# Patient Record
Sex: FEMALE | Race: WHITE | NOT HISPANIC OR LATINO | Employment: FULL TIME | URBAN - METROPOLITAN AREA
[De-identification: names, ages, dates, MRNs, and addresses within clinical notes are randomized per-mention and may not be internally consistent; named-entity substitution may affect disease eponyms.]

---

## 2020-02-18 ENCOUNTER — TRANSCRIBE ORDERS (OUTPATIENT)
Dept: ADMINISTRATIVE | Facility: HOSPITAL | Age: 50
End: 2020-02-18

## 2020-02-18 DIAGNOSIS — Z12.31 VISIT FOR SCREENING MAMMOGRAM: Primary | ICD-10-CM

## 2020-06-18 ENCOUNTER — HOSPITAL ENCOUNTER (OUTPATIENT)
Dept: MAMMOGRAPHY | Facility: HOSPITAL | Age: 50
Discharge: HOME | End: 2020-06-18
Admitting: PHYSICIAN ASSISTANT

## 2020-06-18 DIAGNOSIS — Z12.31 VISIT FOR SCREENING MAMMOGRAM: ICD-10-CM

## 2020-06-18 PROCEDURE — 77063 BREAST TOMOSYNTHESIS BI: CPT | Performed by: RADIOLOGY

## 2020-06-18 PROCEDURE — 77067 SCR MAMMO BI INCL CAD: CPT | Performed by: RADIOLOGY

## 2020-06-18 PROCEDURE — 77063 BREAST TOMOSYNTHESIS BI: CPT

## 2020-06-18 PROCEDURE — 77067 SCR MAMMO BI INCL CAD: CPT

## 2020-12-26 ENCOUNTER — HOSPITAL ENCOUNTER (EMERGENCY)
Facility: HOSPITAL | Age: 50
Discharge: HOME | End: 2020-12-27
Attending: EMERGENCY MEDICINE | Admitting: EMERGENCY MEDICINE

## 2020-12-26 ENCOUNTER — APPOINTMENT (OUTPATIENT)
Dept: CT IMAGING | Facility: HOSPITAL | Age: 50
End: 2020-12-26

## 2020-12-26 DIAGNOSIS — D25.9 UTERINE LEIOMYOMA, UNSPECIFIED LOCATION: ICD-10-CM

## 2020-12-26 DIAGNOSIS — R10.9 RIGHT FLANK PAIN: Primary | ICD-10-CM

## 2020-12-26 DIAGNOSIS — N39.0 ACUTE UTI: ICD-10-CM

## 2020-12-26 DIAGNOSIS — K59.00 CONSTIPATION, UNSPECIFIED CONSTIPATION TYPE: ICD-10-CM

## 2020-12-26 DIAGNOSIS — R10.30 LOWER ABDOMINAL PAIN: ICD-10-CM

## 2020-12-26 LAB
ALBUMIN SERPL-MCNC: 4.6 G/DL (ref 3.5–5.2)
ALBUMIN/GLOB SERPL: 1.6 G/DL
ALP SERPL-CCNC: 108 U/L (ref 39–117)
ALT SERPL W P-5'-P-CCNC: 13 U/L (ref 1–33)
ANION GAP SERPL CALCULATED.3IONS-SCNC: 12 MMOL/L (ref 5–15)
AST SERPL-CCNC: 13 U/L (ref 1–32)
B-HCG UR QL: NEGATIVE
BACTERIA UR QL AUTO: ABNORMAL /HPF
BASOPHILS # BLD AUTO: 0.02 10*3/MM3 (ref 0–0.2)
BASOPHILS NFR BLD AUTO: 0.2 % (ref 0–1.5)
BILIRUB SERPL-MCNC: 0.3 MG/DL (ref 0–1.2)
BILIRUB UR QL STRIP: NEGATIVE
BUN SERPL-MCNC: 15 MG/DL (ref 6–20)
BUN/CREAT SERPL: 17.2 (ref 7–25)
CALCIUM SPEC-SCNC: 9.3 MG/DL (ref 8.6–10.5)
CHLORIDE SERPL-SCNC: 101 MMOL/L (ref 98–107)
CLARITY UR: CLEAR
CO2 SERPL-SCNC: 24 MMOL/L (ref 22–29)
COLOR UR: YELLOW
CREAT SERPL-MCNC: 0.87 MG/DL (ref 0.57–1)
DEPRECATED RDW RBC AUTO: 40.9 FL (ref 37–54)
EOSINOPHIL # BLD AUTO: 0.14 10*3/MM3 (ref 0–0.4)
EOSINOPHIL NFR BLD AUTO: 1.7 % (ref 0.3–6.2)
ERYTHROCYTE [DISTWIDTH] IN BLOOD BY AUTOMATED COUNT: 11.9 % (ref 12.3–15.4)
GFR SERPL CREATININE-BSD FRML MDRD: 68 ML/MIN/1.73
GLOBULIN UR ELPH-MCNC: 2.9 GM/DL
GLUCOSE SERPL-MCNC: 116 MG/DL (ref 65–99)
GLUCOSE UR STRIP-MCNC: NEGATIVE MG/DL
HCT VFR BLD AUTO: 42.6 % (ref 34–46.6)
HGB BLD-MCNC: 14.6 G/DL (ref 12–15.9)
HGB UR QL STRIP.AUTO: ABNORMAL
HOLD SPECIMEN: NORMAL
HOLD SPECIMEN: NORMAL
HYALINE CASTS UR QL AUTO: ABNORMAL /LPF
IMM GRANULOCYTES # BLD AUTO: 0.02 10*3/MM3 (ref 0–0.05)
IMM GRANULOCYTES NFR BLD AUTO: 0.2 % (ref 0–0.5)
KETONES UR QL STRIP: NEGATIVE
LEUKOCYTE ESTERASE UR QL STRIP.AUTO: ABNORMAL
LYMPHOCYTES # BLD AUTO: 2.67 10*3/MM3 (ref 0.7–3.1)
LYMPHOCYTES NFR BLD AUTO: 32.5 % (ref 19.6–45.3)
MCH RBC QN AUTO: 31.7 PG (ref 26.6–33)
MCHC RBC AUTO-ENTMCNC: 34.3 G/DL (ref 31.5–35.7)
MCV RBC AUTO: 92.6 FL (ref 79–97)
MONOCYTES # BLD AUTO: 0.65 10*3/MM3 (ref 0.1–0.9)
MONOCYTES NFR BLD AUTO: 7.9 % (ref 5–12)
NEUTROPHILS NFR BLD AUTO: 4.72 10*3/MM3 (ref 1.7–7)
NEUTROPHILS NFR BLD AUTO: 57.5 % (ref 42.7–76)
NITRITE UR QL STRIP: NEGATIVE
NRBC BLD AUTO-RTO: 0 /100 WBC (ref 0–0.2)
PH UR STRIP.AUTO: 7 [PH] (ref 5–8)
PLATELET # BLD AUTO: 272 10*3/MM3 (ref 140–450)
PMV BLD AUTO: 10.6 FL (ref 6–12)
POTASSIUM SERPL-SCNC: 3.7 MMOL/L (ref 3.5–5.2)
PROT SERPL-MCNC: 7.5 G/DL (ref 6–8.5)
PROT UR QL STRIP: NEGATIVE
RBC # BLD AUTO: 4.6 10*6/MM3 (ref 3.77–5.28)
RBC # UR: ABNORMAL /HPF
REF LAB TEST METHOD: ABNORMAL
SODIUM SERPL-SCNC: 137 MMOL/L (ref 136–145)
SP GR UR STRIP: 1.01 (ref 1–1.03)
SQUAMOUS #/AREA URNS HPF: ABNORMAL /HPF
UROBILINOGEN UR QL STRIP: ABNORMAL
WBC # BLD AUTO: 8.22 10*3/MM3 (ref 3.4–10.8)
WBC UR QL AUTO: ABNORMAL /HPF
WHOLE BLOOD HOLD SPECIMEN: NORMAL
WHOLE BLOOD HOLD SPECIMEN: NORMAL

## 2020-12-26 PROCEDURE — 74176 CT ABD & PELVIS W/O CONTRAST: CPT

## 2020-12-26 PROCEDURE — 81025 URINE PREGNANCY TEST: CPT | Performed by: EMERGENCY MEDICINE

## 2020-12-26 PROCEDURE — 99283 EMERGENCY DEPT VISIT LOW MDM: CPT

## 2020-12-26 PROCEDURE — 81001 URINALYSIS AUTO W/SCOPE: CPT | Performed by: EMERGENCY MEDICINE

## 2020-12-26 PROCEDURE — 80053 COMPREHEN METABOLIC PANEL: CPT

## 2020-12-26 PROCEDURE — 93005 ELECTROCARDIOGRAM TRACING: CPT | Performed by: EMERGENCY MEDICINE

## 2020-12-26 PROCEDURE — 85025 COMPLETE CBC W/AUTO DIFF WBC: CPT

## 2020-12-26 PROCEDURE — 93005 ELECTROCARDIOGRAM TRACING: CPT

## 2020-12-26 RX ORDER — HYDROCODONE BITARTRATE AND ACETAMINOPHEN 5; 325 MG/1; MG/1
1 TABLET ORAL ONCE
Status: COMPLETED | OUTPATIENT
Start: 2020-12-26 | End: 2020-12-27

## 2020-12-27 VITALS
HEIGHT: 65 IN | OXYGEN SATURATION: 96 % | WEIGHT: 180 LBS | BODY MASS INDEX: 29.99 KG/M2 | RESPIRATION RATE: 16 BRPM | SYSTOLIC BLOOD PRESSURE: 108 MMHG | HEART RATE: 66 BPM | DIASTOLIC BLOOD PRESSURE: 78 MMHG | TEMPERATURE: 97.7 F

## 2020-12-27 RX ORDER — HYDROCODONE BITARTRATE AND ACETAMINOPHEN 5; 325 MG/1; MG/1
1 TABLET ORAL EVERY 6 HOURS PRN
Qty: 12 TABLET | Refills: 0 | Status: SHIPPED | OUTPATIENT
Start: 2020-12-27 | End: 2020-12-30

## 2020-12-27 RX ORDER — CEFDINIR 300 MG/1
300 CAPSULE ORAL 2 TIMES DAILY
Qty: 14 CAPSULE | Refills: 0 | Status: SHIPPED | OUTPATIENT
Start: 2020-12-27 | End: 2021-01-03

## 2020-12-27 RX ADMIN — HYDROCODONE BITARTRATE AND ACETAMINOPHEN 1 TABLET: 5; 325 TABLET ORAL at 00:03

## 2020-12-27 NOTE — ED PROVIDER NOTES
Subjective   Patient is a 50-year-old female who presents to the emergency room today with complaints of right flank pain.  Patient states that she has had low back pain on her right side for approximately the last 3 days.  She states that today the pain began to migrate to the right side of her lower abdomen.  She shares taking a ibuprofen at approximately 8:30 PM this evening which helped to alleviate the pain.  She shares that laying back makes the pain worse and that leaning forward helps to alleviate the pain.  She states that she is going through changes with her menstrual period and has noticed some intermittent spotting recently.  She denies any additional associated symptoms on interview and exam.        COVID-19 RISK SCREEN     1. Has the patient had close contact without PPE with a lab confirmed COVID-19 (+) person or a person under investigation (PUI) for COVID-19 infection?  -- NO    2. Has the patient had respiratory symptoms, worsened/new cough and/or SOA, unexplained fever, or sudden loss of smell and/or taste in the past 7 days? --  NO    3. Does the patient have baseline higher exposure risk such as working in healthcare field, currently residing in healthcare facility, or ongoing hemodialysis?  --  NO       Review of Systems   Constitutional: Negative for chills and fever.   HENT: Negative.    Eyes: Negative.    Respiratory: Negative for cough, chest tightness and shortness of breath.    Cardiovascular: Negative.    Gastrointestinal: Positive for abdominal pain. Negative for constipation, diarrhea, nausea and vomiting.   Genitourinary: Positive for flank pain, menstrual problem and vaginal bleeding. Negative for dysuria, pelvic pain, vaginal discharge and vaginal pain.   Musculoskeletal: Positive for back pain.   Skin: Negative.    Neurological: Negative.    Psychiatric/Behavioral: Negative.    All other systems reviewed and are negative.      History reviewed. No pertinent past medical  history.    No Known Allergies    Past Surgical History:   Procedure Laterality Date   • BREAST BIOPSY Right     2001       Family History   Problem Relation Age of Onset   • Breast cancer Maternal Grandmother 60   • Ovarian cancer Neg Hx        Social History     Socioeconomic History   • Marital status:      Spouse name: Not on file   • Number of children: Not on file   • Years of education: Not on file   • Highest education level: Not on file           Objective   Physical Exam  Vitals signs and nursing note reviewed.   Constitutional:       General: She is not in acute distress.     Appearance: She is well-developed. She is not ill-appearing or toxic-appearing.   HENT:      Head: Normocephalic and atraumatic.      Mouth/Throat:      Mouth: Mucous membranes are moist.   Eyes:      Extraocular Movements: Extraocular movements intact.   Neck:      Musculoskeletal: Normal range of motion and neck supple.   Cardiovascular:      Rate and Rhythm: Normal rate and regular rhythm.   Pulmonary:      Effort: Pulmonary effort is normal. No respiratory distress.      Breath sounds: Normal breath sounds.   Abdominal:      General: Bowel sounds are normal. There is no distension.      Palpations: Abdomen is soft.      Tenderness: There is generalized abdominal tenderness. There is right CVA tenderness.   Musculoskeletal: Normal range of motion.   Skin:     General: Skin is warm and dry.   Neurological:      Mental Status: She is alert and oriented to person, place, and time.   Psychiatric:         Behavior: Behavior normal.         Thought Content: Thought content normal.         Judgment: Judgment normal.         Procedures           ED Course  ED Course as of Dec 27 0050   Sun Dec 27, 2020   0046 Patient presents to ED with complaints of worsening low back pain on her right side now that radiates into her lower abdomen.  Right-sided CVA tenderness, diffuse abdominal tenderness to palpation on physical exam.  Urinalysis  with evidence of moderate leukocytes, trace bacteria, RBCs and WBC.  No additional acute abnormalities on labs.  CT scan of the abdomen and pelvis demonstrates no renal or ureteral stones, no hydronephrosis a fibroid uterus and constipation.  Patient responded well to pain medication while in the emergency department.  Will be discharged home with antibiotics for her UTI and a short course of pain medicine for her uterine fibroids.  Patient instructed on follow-up care with her primary care physician and OB/GYN.  Patient and family at bedside are agreeable to plan of care, provided return precautions and showed understanding.  At time of discharge disposition, patient resting comfortably, in no acute distress, vital signs stable.    [JG]      ED Course User Index  [JG] Enmanuel Ruby, PA      Recent Results (from the past 24 hour(s))   Comprehensive Metabolic Panel    Collection Time: 12/26/20  9:40 PM    Specimen: Blood   Result Value Ref Range    Glucose 116 (H) 65 - 99 mg/dL    BUN 15 6 - 20 mg/dL    Creatinine 0.87 0.57 - 1.00 mg/dL    Sodium 137 136 - 145 mmol/L    Potassium 3.7 3.5 - 5.2 mmol/L    Chloride 101 98 - 107 mmol/L    CO2 24.0 22.0 - 29.0 mmol/L    Calcium 9.3 8.6 - 10.5 mg/dL    Total Protein 7.5 6.0 - 8.5 g/dL    Albumin 4.60 3.50 - 5.20 g/dL    ALT (SGPT) 13 1 - 33 U/L    AST (SGOT) 13 1 - 32 U/L    Alkaline Phosphatase 108 39 - 117 U/L    Total Bilirubin 0.3 0.0 - 1.2 mg/dL    eGFR Non African Amer 68 >60 mL/min/1.73    Globulin 2.9 gm/dL    A/G Ratio 1.6 g/dL    BUN/Creatinine Ratio 17.2 7.0 - 25.0    Anion Gap 12.0 5.0 - 15.0 mmol/L   CBC Auto Differential    Collection Time: 12/26/20  9:40 PM    Specimen: Blood   Result Value Ref Range    WBC 8.22 3.40 - 10.80 10*3/mm3    RBC 4.60 3.77 - 5.28 10*6/mm3    Hemoglobin 14.6 12.0 - 15.9 g/dL    Hematocrit 42.6 34.0 - 46.6 %    MCV 92.6 79.0 - 97.0 fL    MCH 31.7 26.6 - 33.0 pg    MCHC 34.3 31.5 - 35.7 g/dL    RDW 11.9 (L) 12.3 - 15.4 %    RDW-SD  40.9 37.0 - 54.0 fl    MPV 10.6 6.0 - 12.0 fL    Platelets 272 140 - 450 10*3/mm3    Neutrophil % 57.5 42.7 - 76.0 %    Lymphocyte % 32.5 19.6 - 45.3 %    Monocyte % 7.9 5.0 - 12.0 %    Eosinophil % 1.7 0.3 - 6.2 %    Basophil % 0.2 0.0 - 1.5 %    Immature Grans % 0.2 0.0 - 0.5 %    Neutrophils, Absolute 4.72 1.70 - 7.00 10*3/mm3    Lymphocytes, Absolute 2.67 0.70 - 3.10 10*3/mm3    Monocytes, Absolute 0.65 0.10 - 0.90 10*3/mm3    Eosinophils, Absolute 0.14 0.00 - 0.40 10*3/mm3    Basophils, Absolute 0.02 0.00 - 0.20 10*3/mm3    Immature Grans, Absolute 0.02 0.00 - 0.05 10*3/mm3    nRBC 0.0 0.0 - 0.2 /100 WBC   Light Blue Top    Collection Time: 12/26/20  9:40 PM   Result Value Ref Range    Extra Tube hold for add-on    Green Top (Gel)    Collection Time: 12/26/20  9:40 PM   Result Value Ref Range    Extra Tube Hold for add-ons.    Lavender Top    Collection Time: 12/26/20  9:40 PM   Result Value Ref Range    Extra Tube hold for add-on    Gold Top - SST    Collection Time: 12/26/20  9:40 PM   Result Value Ref Range    Extra Tube Hold for add-ons.    Urinalysis With Microscopic If Indicated (No Culture) - Urine, Clean Catch    Collection Time: 12/26/20 10:27 PM    Specimen: Urine, Clean Catch   Result Value Ref Range    Color, UA Yellow Yellow, Straw    Appearance, UA Clear Clear    pH, UA 7.0 5.0 - 8.0    Specific Gravity, UA 1.010 1.001 - 1.030    Glucose, UA Negative Negative    Ketones, UA Negative Negative    Bilirubin, UA Negative Negative    Blood, UA Large (3+) (A) Negative    Protein, UA Negative Negative    Leuk Esterase, UA Moderate (2+) (A) Negative    Nitrite, UA Negative Negative    Urobilinogen, UA 0.2 E.U./dL 0.2 - 1.0 E.U./dL   Pregnancy, Urine - Urine, Clean Catch    Collection Time: 12/26/20 10:27 PM    Specimen: Urine, Clean Catch   Result Value Ref Range    HCG, Urine QL Negative Negative   Urinalysis, Microscopic Only - Urine, Clean Catch    Collection Time: 12/26/20 10:27 PM    Specimen: Urine,  "Clean Catch   Result Value Ref Range    RBC, UA 13-20 (A) None Seen, 0-2 /HPF    WBC, UA 21-30 (A) None Seen, 0-2 /HPF    Bacteria, UA Trace None Seen, Trace /HPF    Squamous Epithelial Cells, UA 0-2 None Seen, 0-2 /HPF    Hyaline Casts, UA 0-6 0 - 6 /LPF    Methodology Automated Microscopy      Note: In addition to lab results from this visit, the labs listed above may include labs taken at another facility or during a different encounter within the last 24 hours. Please correlate lab times with ED admission and discharge times for further clarification of the services performed during this visit.    CT Abdomen Pelvis Without Contrast   Final Result      1. No renal or ureteral stones. No hydronephrosis.   2. Fibroid uterus.   3. Evidence of stasis in the distal small bowel with no small bowel obstruction identified. There is also moderate stool burden in the proximal colon.   4. Nonvisualization of the appendix. No evidence of acute appendicitis however.               Signer Name: Amandeep Barragan MD    Signed: 12/26/2020 11:28 PM    Workstation Name: [id number]     [facility]        Vitals:    12/26/20 2128 12/27/20 0000 12/27/20 0030   BP: 148/91 114/87 108/78   BP Location: Left arm     Patient Position: Sitting     Pulse: 81 69 66   Resp: 16     Temp: 97.7 °F (36.5 °C)     TempSrc: Infrared     SpO2: 100% 95% 96%   Weight: 81.6 kg (180 lb)     Height: 165.1 cm (65\")       Medications   HYDROcodone-acetaminophen (NORCO) 5-325 MG per tablet 1 tablet (1 tablet Oral Given 12/27/20 0003)     ECG/EMG Results (last 24 hours)     Procedure Component Value Units Date/Time    ECG 12 Lead [[id number]] Collected: 12/26/20 2134     Updated: 12/26/20 2135        ECG 12 Lead                DISCHARGE    Patient discharged in stable condition.    Reviewed implications of results, diagnosis, meds, responsibility to follow up, warning signs and symptoms of possible worsening, potential complications and " reasons to return to ER.    Patient/Family voiced understanding of above instructions.    Discussed plan for discharge, as there is no emergent indication for admission.  Pt/family is agreeable and understands need for follow up and possible repeat testing.  Pt/family is aware that discharge does not mean that nothing is wrong but that it indicates no emergency is currently present that requires admission and they must continue care with follow-up as given below or with a physician of their choice.     FOLLOW-UP  Kiesha Berger, PA  [address] [place] [address] [address]  [facility] [address] [phone]    Schedule an appointment as soon as possible for a visit       Landry Quiñonez MD  [address] [facility] [address]  [place] [id number] [phone]    Schedule an appointment as soon as possible for a visit   Call for follow up with [facility] Emergency Department  [address] [facility] [address] [phone]  Go to   If symptoms worsen         Medication List      New Prescriptions    cefdinir 300 MG capsule  Commonly known as: OMNICEF  Take 1 capsule by mouth 2 (Two) Times a Day for 7 days.     HYDROcodone-acetaminophen 5-325 MG per tablet  Commonly known as: NORCO  Take 1 tablet by mouth Every 6 (Six) Hours As Needed for Moderate Pain  for up to 3 days.           Where to Get Your Medications      These medications were sent to [facility] [address] [place] [address] - [address] [place] [address] [phone]  - [phone] [address] [address]    Phone: [phone]   · cefdinir 300 MG capsule  · HYDROcodone-acetaminophen 5-325 MG per tablet          TOD query complete. Treatment plan to include limited course of prescribed  controlled substance. Risks including addiction, benefits, and alternatives presented to patient.                            MDM  Number of Diagnoses or Management Options  Acute UTI: new and requires  workup  Constipation, unspecified constipation type: new and requires workup  Lower abdominal pain: new and requires workup  Right flank pain: new and requires workup  Uterine leiomyoma, unspecified location: new and requires workup     Amount and/or Complexity of Data Reviewed  Clinical lab tests: reviewed  Tests in the radiology section of CPT®: reviewed  Tests in the medicine section of CPT®: reviewed    Risk of Complications, Morbidity, and/or Mortality  Presenting problems: moderate  Diagnostic procedures: moderate  Management options: moderate    Patient Progress  Patient progress: stable      Final diagnoses:   Right flank pain   Acute UTI   Lower abdominal pain   Constipation, unspecified constipation type   Uterine leiomyoma, unspecified location            Enmanuel Ruby, PA  12/27/20 0051

## 2020-12-27 NOTE — DISCHARGE INSTRUCTIONS
Symptomatic care is recommended. Take all medications as prescribed and instructed. Take and complete full course of antibiotics as prescribed. Follow up with your primary care and OBGYN as directed or return to Emergency Department with worsening of symptoms.

## 2020-12-28 LAB
QT INTERVAL: 366 MS
QTC INTERVAL: 417 MS

## 2021-01-12 ENCOUNTER — OFFICE VISIT (OUTPATIENT)
Dept: OBSTETRICS AND GYNECOLOGY | Facility: CLINIC | Age: 51
End: 2021-01-12

## 2021-01-12 VITALS
SYSTOLIC BLOOD PRESSURE: 122 MMHG | HEIGHT: 65 IN | DIASTOLIC BLOOD PRESSURE: 78 MMHG | BODY MASS INDEX: 30.99 KG/M2 | WEIGHT: 186 LBS

## 2021-01-12 DIAGNOSIS — N93.9 ABNORMAL UTERINE BLEEDING (AUB): ICD-10-CM

## 2021-01-12 DIAGNOSIS — Z01.419 WOMEN'S ANNUAL ROUTINE GYNECOLOGICAL EXAMINATION: Primary | ICD-10-CM

## 2021-01-12 PROCEDURE — 99213 OFFICE O/P EST LOW 20 MIN: CPT | Performed by: OBSTETRICS & GYNECOLOGY

## 2021-01-12 PROCEDURE — 99396 PREV VISIT EST AGE 40-64: CPT | Performed by: OBSTETRICS & GYNECOLOGY

## 2021-01-12 NOTE — PROGRESS NOTES
GYN Annual Exam     CC - Here for annual exam.     Subjective   HPI  Lluvia Kumar is a 50 y.o. female, , who presents for annual well woman exam.  She is perimenopausal.  Patient reports problems with: changes in libido, hot flashes and vaginal dryness, night sweats, irritabilty.  Partner Status: Marital Status: .  New Partners since last visit: no.      Her periods are irregular between 2-6 weeks, lasting 4-5 days, heavy flow, with clots.  She uses 4-5 pads per day during her periods.  She states that her dysmenorrhea is severe and begins premenstrually and first couple days of her period.and is becoming more severe.       Patient was treated in ER on 2020 for pelvic, flank, lower back pain.  She was given an antibiotic to treat a urinary infection, which she completed.  They did a CT scan and told her she was constipated and had fibroids in her uterus.  She was told to take Dulcolax for constipation and to schedule with OB/GYN for a follow-up concerning the fibroids.  She states that this pain was acute and started about 3 days before she went to the ER.  She has had a ruptured cyst in the past and states this was different and not so severe.    Additional OB/GYN History   Current contraception: contraceptive methods: Condoms  Desires to: continue contraception  Last Pap :   Last Completed Pap Smear       Status Date      PAP SMEAR Patient-Reported (Performed Externally) 2019 normal per pt        History of abnormal Pap smear: yes - had abnormal paps - unsure of the exact results.  Reports she was only retested and never had a colposcopy.   Family history of uterine, colon, breast, or ovarian cancer: yes - paternal grandmother and daughters that had breast cancer  Performs monthly Self-Breast Exam: yes  Last mammogram:   Last Completed Mammogram       Status Date      MAMMOGRAM Done 2020 MAMMO SCREENING DIGITAL TOMOSYNTHESIS BILATERAL W CAD        Last colonoscopy:   Last Completed  "Colonoscopy       Status Date      COLONOSCOPY Done 2020 normal per pt        Last DEXA: none   Exercises Regularly: no  Feelings of Anxiety or Depression: reports some anxiety but not treated with medication    Tobacco Usage?: No   OB History        2    Para   2    Term   2            AB        Living   2       SAB        TAB        Ectopic        Molar        Multiple        Live Births   2                Health Maintenance   Topic Date Due   • Annual Gynecologic Pelvic and Breast Exam  1970   • ANNUAL PHYSICAL  1971   • TDAP/TD VACCINES (1 - Tdap) 1987   • ZOSTER VACCINE (1 of 2) 2018   • INFLUENZA VACCINE  2020   • HEPATITIS C SCREENING  2021   • PAP SMEAR  2022   • MAMMOGRAM  2022   • COLONOSCOPY  2030   • Pneumococcal Vaccine 0-64  Aged Out   • MENINGOCOCCAL VACCINE  Aged Out       The additional following portions of the patient's history were reviewed and updated as appropriate: allergies, current medications, past family history, past medical history, past social history, past surgical history and problem list.    Review of Systems   Constitutional: Negative.    HENT: Negative.    Eyes: Negative.    Respiratory: Negative.    Cardiovascular: Positive for palpitations.   Gastrointestinal: Positive for constipation.   Endocrine: Negative.    Genitourinary: Positive for pelvic pain.   Musculoskeletal: Positive for back pain.   Skin: Negative.    Allergic/Immunologic: Negative.    Neurological: Negative.    Psychiatric/Behavioral: The patient is nervous/anxious.        I have reviewed and agree with the HPI, ROS, and historical information as entered above. Sushma Acevedo MD    Objective   /78 (BP Location: Right arm, Patient Position: Sitting)   Ht 165.1 cm (65\")   Wt 84.4 kg (186 lb)   LMP 2020 (Exact Date)   Breastfeeding No   BMI 30.95 kg/m²     Physical Exam    General:  well developed; well nourished  no acute " distress  Skin:  No suspicious lesions seen  Thyroid: normal to inspection and palpation  Breasts:  Examined in supine position  Symmetric without masses or skin dimpling  Nipples normal without inversion, lesions or discharge  There are no palpable axillary nodes  CVS: RRR, no M/R/G, distal pulses wnl  Resp: CTAB, No W/R/R  Abdomen: soft, non-tender; no masses  no umbilical or inguinal hernias are present  no hepato-splenomegaly  Pelvis: Clinical staff was present for exam  External genitalia:  normal appearance of the external genitalia including Bartholin's and Jemison's glands.  Urethra: no masses or tenderness  Urethral meatus: normal size;  No lesions or signs of prolapse  Bladder: non tender to palpation, no masses, no prolapse  Vagina:  normal pink mucosa without prolapse or lesions.  Cervix:  normal appearance.  Uterus:  normal size, shape and consistency.  Adnexa:  normal bimanual exam of the adnexa.  Perineum/Anus: normal appearance, no external hemorrhoids  Ext: 2+ pulses, no edema    Assessment/Plan     Assessment     Problem List Items Addressed This Visit     Women's annual routine gynecological examination - Primary    Relevant Orders    Pap IG, HPV-hr    Abnormal uterine bleeding (AUB)    Relevant Orders    US Non-ob Transvaginal          1. GYN annual well woman exam.   2. AUB    Plan     1. Pap screening guidelines reviewed  2. Pap with HPV done  3. Symptoms of menopausal transition reviewed with patient.  Reviewed risks/benefits of OTC, non-hormonal and hormonal treatment for vasomotor and other menopausal symptoms.  4. Reviewed monthly self breast exams.  Instructed to call with lumps, pain, or breast discharge.  5. Patient is current for both mammogram and colonoscopy  6. Other: Discussed bleeding and pain.  Location of pain does seem to be a fibroid, but we discussed unusual to have acute pain like this from it.  Plan to f/u for sono, possible EMB.       Sushma Acevedo MD  01/12/2021

## 2021-01-14 PROBLEM — Z01.419 WOMEN'S ANNUAL ROUTINE GYNECOLOGICAL EXAMINATION: Status: ACTIVE | Noted: 2021-01-14

## 2021-01-14 PROBLEM — N93.9 ABNORMAL UTERINE BLEEDING (AUB): Status: ACTIVE | Noted: 2021-01-14

## 2021-01-18 DIAGNOSIS — Z01.419 WOMEN'S ANNUAL ROUTINE GYNECOLOGICAL EXAMINATION: ICD-10-CM

## 2021-01-25 ENCOUNTER — OFFICE VISIT (OUTPATIENT)
Dept: OBSTETRICS AND GYNECOLOGY | Facility: CLINIC | Age: 51
End: 2021-01-25

## 2021-01-25 VITALS
BODY MASS INDEX: 31.29 KG/M2 | DIASTOLIC BLOOD PRESSURE: 74 MMHG | WEIGHT: 187.8 LBS | HEIGHT: 65 IN | SYSTOLIC BLOOD PRESSURE: 122 MMHG

## 2021-01-25 DIAGNOSIS — N93.9 ABNORMAL UTERINE BLEEDING (AUB): Primary | ICD-10-CM

## 2021-01-25 PROCEDURE — 99213 OFFICE O/P EST LOW 20 MIN: CPT | Performed by: OBSTETRICS & GYNECOLOGY

## 2021-01-25 PROCEDURE — 58100 BIOPSY OF UTERUS LINING: CPT | Performed by: OBSTETRICS & GYNECOLOGY

## 2021-01-31 ENCOUNTER — APPOINTMENT (OUTPATIENT)
Dept: PREADMISSION TESTING | Facility: HOSPITAL | Age: 51
End: 2021-01-31

## 2021-01-31 PROCEDURE — U0004 COV-19 TEST NON-CDC HGH THRU: HCPCS

## 2021-01-31 PROCEDURE — C9803 HOPD COVID-19 SPEC COLLECT: HCPCS

## 2021-02-01 LAB — SARS-COV-2 RNA RESP QL NAA+PROBE: NORMAL

## 2021-02-02 ENCOUNTER — ANESTHESIA EVENT (OUTPATIENT)
Dept: PERIOP | Facility: HOSPITAL | Age: 51
End: 2021-02-02

## 2021-02-02 ENCOUNTER — OFFICE VISIT (OUTPATIENT)
Dept: OBSTETRICS AND GYNECOLOGY | Facility: CLINIC | Age: 51
End: 2021-02-02

## 2021-02-02 ENCOUNTER — APPOINTMENT (OUTPATIENT)
Dept: PREADMISSION TESTING | Facility: HOSPITAL | Age: 51
End: 2021-02-02

## 2021-02-02 ENCOUNTER — PREP FOR SURGERY (OUTPATIENT)
Dept: OTHER | Facility: HOSPITAL | Age: 51
End: 2021-02-02

## 2021-02-02 VITALS — WEIGHT: 188 LBS | BODY MASS INDEX: 31.32 KG/M2 | HEIGHT: 65 IN

## 2021-02-02 VITALS
BODY MASS INDEX: 31.49 KG/M2 | WEIGHT: 189 LBS | DIASTOLIC BLOOD PRESSURE: 78 MMHG | HEIGHT: 65 IN | SYSTOLIC BLOOD PRESSURE: 120 MMHG

## 2021-02-02 DIAGNOSIS — D25.9 UTERINE LEIOMYOMA, UNSPECIFIED LOCATION: ICD-10-CM

## 2021-02-02 DIAGNOSIS — R10.2 PELVIC PAIN: ICD-10-CM

## 2021-02-02 DIAGNOSIS — D25.9 UTERINE LEIOMYOMA, UNSPECIFIED LOCATION: Primary | ICD-10-CM

## 2021-02-02 DIAGNOSIS — N93.9 ABNORMAL UTERINE BLEEDING (AUB): ICD-10-CM

## 2021-02-02 DIAGNOSIS — D25.1 INTRAMURAL, SUBMUCOUS, AND SUBSEROUS LEIOMYOMA OF UTERUS: Primary | ICD-10-CM

## 2021-02-02 DIAGNOSIS — D25.2 INTRAMURAL, SUBMUCOUS, AND SUBSEROUS LEIOMYOMA OF UTERUS: Primary | ICD-10-CM

## 2021-02-02 DIAGNOSIS — D25.0 INTRAMURAL, SUBMUCOUS, AND SUBSEROUS LEIOMYOMA OF UTERUS: Primary | ICD-10-CM

## 2021-02-02 LAB
ABO GROUP BLD: NORMAL
ANION GAP SERPL CALCULATED.3IONS-SCNC: 11 MMOL/L (ref 5–15)
B-HCG UR QL: NEGATIVE
BASOPHILS # BLD AUTO: 0.02 10*3/MM3 (ref 0–0.2)
BASOPHILS NFR BLD AUTO: 0.3 % (ref 0–1.5)
BLD GP AB SCN SERPL QL: NEGATIVE
BUN SERPL-MCNC: 14 MG/DL (ref 6–20)
BUN/CREAT SERPL: 17.3 (ref 7–25)
CALCIUM SPEC-SCNC: 9.8 MG/DL (ref 8.6–10.5)
CHLORIDE SERPL-SCNC: 102 MMOL/L (ref 98–107)
CO2 SERPL-SCNC: 29 MMOL/L (ref 22–29)
CREAT SERPL-MCNC: 0.81 MG/DL (ref 0.57–1)
DEPRECATED RDW RBC AUTO: 41.8 FL (ref 37–54)
EOSINOPHIL # BLD AUTO: 0.09 10*3/MM3 (ref 0–0.4)
EOSINOPHIL NFR BLD AUTO: 1.3 % (ref 0.3–6.2)
ERYTHROCYTE [DISTWIDTH] IN BLOOD BY AUTOMATED COUNT: 12.4 % (ref 12.3–15.4)
GFR SERPL CREATININE-BSD FRML MDRD: 74 ML/MIN/1.73
GLUCOSE SERPL-MCNC: 99 MG/DL (ref 65–99)
HCT VFR BLD AUTO: 41.5 % (ref 34–46.6)
HGB BLD-MCNC: 14 G/DL (ref 12–15.9)
IMM GRANULOCYTES # BLD AUTO: 0.02 10*3/MM3 (ref 0–0.05)
IMM GRANULOCYTES NFR BLD AUTO: 0.3 % (ref 0–0.5)
LYMPHOCYTES # BLD AUTO: 1.58 10*3/MM3 (ref 0.7–3.1)
LYMPHOCYTES NFR BLD AUTO: 22.7 % (ref 19.6–45.3)
MCH RBC QN AUTO: 31.1 PG (ref 26.6–33)
MCHC RBC AUTO-ENTMCNC: 33.7 G/DL (ref 31.5–35.7)
MCV RBC AUTO: 92.2 FL (ref 79–97)
MONOCYTES # BLD AUTO: 0.44 10*3/MM3 (ref 0.1–0.9)
MONOCYTES NFR BLD AUTO: 6.3 % (ref 5–12)
NEUTROPHILS NFR BLD AUTO: 4.82 10*3/MM3 (ref 1.7–7)
NEUTROPHILS NFR BLD AUTO: 69.1 % (ref 42.7–76)
NRBC BLD AUTO-RTO: 0 /100 WBC (ref 0–0.2)
PLATELET # BLD AUTO: 244 10*3/MM3 (ref 140–450)
PMV BLD AUTO: 10.7 FL (ref 6–12)
POTASSIUM SERPL-SCNC: 4.1 MMOL/L (ref 3.5–5.2)
RBC # BLD AUTO: 4.5 10*6/MM3 (ref 3.77–5.28)
RH BLD: POSITIVE
SARS-COV-2 RNA RESP QL NAA+PROBE: NOT DETECTED
SODIUM SERPL-SCNC: 142 MMOL/L (ref 136–145)
T&S EXPIRATION DATE: NORMAL
WBC # BLD AUTO: 6.97 10*3/MM3 (ref 3.4–10.8)

## 2021-02-02 PROCEDURE — 81025 URINE PREGNANCY TEST: CPT

## 2021-02-02 PROCEDURE — C9803 HOPD COVID-19 SPEC COLLECT: HCPCS

## 2021-02-02 PROCEDURE — 80048 BASIC METABOLIC PNL TOTAL CA: CPT

## 2021-02-02 PROCEDURE — 86901 BLOOD TYPING SEROLOGIC RH(D): CPT | Performed by: OBSTETRICS & GYNECOLOGY

## 2021-02-02 PROCEDURE — U0003 INFECTIOUS AGENT DETECTION BY NUCLEIC ACID (DNA OR RNA); SEVERE ACUTE RESPIRATORY SYNDROME CORONAVIRUS 2 (SARS-COV-2) (CORONAVIRUS DISEASE [COVID-19]), AMPLIFIED PROBE TECHNIQUE, MAKING USE OF HIGH THROUGHPUT TECHNOLOGIES AS DESCRIBED BY CMS-2020-01-R: HCPCS

## 2021-02-02 PROCEDURE — S0260 H&P FOR SURGERY: HCPCS | Performed by: OBSTETRICS & GYNECOLOGY

## 2021-02-02 PROCEDURE — 86900 BLOOD TYPING SEROLOGIC ABO: CPT | Performed by: OBSTETRICS & GYNECOLOGY

## 2021-02-02 PROCEDURE — 99024 POSTOP FOLLOW-UP VISIT: CPT | Performed by: OBSTETRICS & GYNECOLOGY

## 2021-02-02 PROCEDURE — 85025 COMPLETE CBC W/AUTO DIFF WBC: CPT

## 2021-02-02 PROCEDURE — 86850 RBC ANTIBODY SCREEN: CPT | Performed by: OBSTETRICS & GYNECOLOGY

## 2021-02-02 PROCEDURE — 36415 COLL VENOUS BLD VENIPUNCTURE: CPT

## 2021-02-02 RX ORDER — ACETAMINOPHEN 500 MG
1000 TABLET ORAL ONCE
Status: CANCELLED | OUTPATIENT
Start: 2021-02-02 | End: 2021-02-02

## 2021-02-02 RX ORDER — SODIUM CHLORIDE 0.9 % (FLUSH) 0.9 %
10 SYRINGE (ML) INJECTION AS NEEDED
Status: CANCELLED | OUTPATIENT
Start: 2021-02-02

## 2021-02-02 RX ORDER — CEFAZOLIN SODIUM 2 G/100ML
2 INJECTION, SOLUTION INTRAVENOUS ONCE
Status: CANCELLED | OUTPATIENT
Start: 2021-02-02 | End: 2021-02-02

## 2021-02-02 RX ORDER — SCOLOPAMINE TRANSDERMAL SYSTEM 1 MG/1
1 PATCH, EXTENDED RELEASE TRANSDERMAL CONTINUOUS
Status: CANCELLED | OUTPATIENT
Start: 2021-02-02 | End: 2021-02-05

## 2021-02-02 RX ORDER — GABAPENTIN 300 MG/1
600 CAPSULE ORAL ONCE
Status: CANCELLED | OUTPATIENT
Start: 2021-02-02 | End: 2021-02-02

## 2021-02-02 RX ORDER — SODIUM CHLORIDE, SODIUM LACTATE, POTASSIUM CHLORIDE, CALCIUM CHLORIDE 600; 310; 30; 20 MG/100ML; MG/100ML; MG/100ML; MG/100ML
125 INJECTION, SOLUTION INTRAVENOUS CONTINUOUS
Status: CANCELLED | OUTPATIENT
Start: 2021-02-02

## 2021-02-02 RX ORDER — SODIUM CHLORIDE 0.9 % (FLUSH) 0.9 %
3 SYRINGE (ML) INJECTION EVERY 12 HOURS SCHEDULED
Status: CANCELLED | OUTPATIENT
Start: 2021-02-02

## 2021-02-02 RX ORDER — PHENAZOPYRIDINE HYDROCHLORIDE 100 MG/1
200 TABLET, FILM COATED ORAL ONCE
Status: CANCELLED | OUTPATIENT
Start: 2021-02-02 | End: 2021-02-02

## 2021-02-02 RX ORDER — SODIUM CHLORIDE 0.9 % (FLUSH) 0.9 %
10 SYRINGE (ML) INJECTION EVERY 12 HOURS SCHEDULED
Status: CANCELLED | OUTPATIENT
Start: 2021-02-02

## 2021-02-02 RX ORDER — FAMOTIDINE 10 MG/ML
20 INJECTION, SOLUTION INTRAVENOUS ONCE
Status: CANCELLED | OUTPATIENT
Start: 2021-02-02 | End: 2021-02-02

## 2021-02-02 NOTE — PROGRESS NOTES
Subjective     Lluvia Kumar is a 50 y.o. female, , who is scheduled for [facility] w/BS surgery due to abnormal uterine bleeding.  She is perimenopausal.  Patient reports problems with: changes in libido, hot flashes and vaginal dryness, night sweats, irritabilty.       Her periods are irregular between 2-6 weeks, lasting 4-5 days, heavy flow, with clots.  She uses 4-5 pads per day during her periods.  She states that her dysmenorrhea is severe and begins premenstrually and first couple days of her period.and is becoming more severe.        Patient was treated in ER on 2020 for pelvic, flank, lower back pain.  She was given an antibiotic to treat a urinary infection, which she completed.  They did a CT scan and told her she was constipated and had fibroids in her uterus.  She was told to take Dulcolax for constipation and to schedule with OB/GYN for a follow-up concerning the fibroids.  She states that this pain was acute and started about 3 days before she went to the ER.  She has had a ruptured cyst in the past and states this was different and not so severe.     Partner Status: Marital Status: .  New Partners since last visit: no.      Past Medical History:   Diagnosis Date   • Anxiety    • Breast lump     right   • Fibrocystic breast    • Fibroids    • History of indigestion    • History of transfusion     in college needed transfusion due to anemia   • Hx of abnormal cervical Pap smear     unsure of dates or results - never had colposcopy   • Ovarian cyst      Past Surgical History:   Procedure Laterality Date   • TONSILLECTOMY     • WISDOM TOOTH EXTRACTION     • BREAST BIOPSY Right        • BREAST LUMPECTOMY      had 2 removed   •  SECTION  ,    • COLONOSCOPY       OB History    Para Term  AB Living   2 2 2 0 0 2   SAB TAB Ectopic Molar Multiple Live Births   0 0 0 0 0 2      # Outcome Date GA Lbr Jason/2nd Weight Sex Delivery Anes PTL Lv   2 Term       "CS-Unspec   NATALIE   1 Term       - P   NATALIE     Social History     Tobacco Use   Smoking Status Former Smoker   • Packs/day: 1.00   • Years: 10.00   • Pack years: 10.00   • Types: Cigarettes   • Quit date:    • Years since quittin.1   Smokeless Tobacco Never Used     Social History     Substance and Sexual Activity   Alcohol Use Yes   • Alcohol/week: 2.0 standard drinks   • Types: 2 Glasses of wine per week   • Frequency: 2-3 times a week   • Drinks per session: 1 or 2     Social History     Substance and Sexual Activity   Drug Use Never     Prior to Admission medications    Not on File     No Known Allergies    Review of Systems      Objective   /78   Ht 165.1 cm (65\")   Wt 85.7 kg (189 lb)   LMP 2021   Breastfeeding No   BMI 31.45 kg/m²   General: well developed; well nourished  no acute distress   Heart: Not performed.   Lungs: breathing is unlabored   Abdomen: soft, non-tender; no masses  no umbilical or inguinal hernias are present  no hepato-splenomegaly   Pelvis: Not performed.today     TV sono: numerous leiyomyoma (~7) measuring up to 3.3cm, mostly intramural and submucosal. ET 9.6cm Bilateral adnexa wnl.   EMB benign     Assessment      Diagnosis       • Abnormal uterine bleeding (AUB)   • Fibroid uterus  Pelvic pain          Plan   1. We have discussed risk benefits alternatives and patient wished to proceed with planned robotic assisted total laparoscopic hysterectomy with bilateral salpingo-oophorectomy.  We have discussed risks of bleeding, infection, risk to other organs including bowel bladder and ureters.  We have also discussed possible ovarian conservation versus BSO and patient would like the latter.  All questions answered and patient voices understanding.      Sushma Acevedo MD  2021       (Pt's PCP is Kiesha Begrer PA)      "

## 2021-02-03 ENCOUNTER — ANESTHESIA (OUTPATIENT)
Dept: PERIOP | Facility: HOSPITAL | Age: 51
End: 2021-02-03

## 2021-02-03 ENCOUNTER — HOSPITAL ENCOUNTER (OUTPATIENT)
Facility: HOSPITAL | Age: 51
Discharge: HOME | End: 2021-02-04
Attending: OBSTETRICS & GYNECOLOGY | Admitting: OBSTETRICS & GYNECOLOGY

## 2021-02-03 DIAGNOSIS — R10.2 PELVIC PAIN: ICD-10-CM

## 2021-02-03 DIAGNOSIS — D25.2 INTRAMURAL, SUBMUCOUS, AND SUBSEROUS LEIOMYOMA OF UTERUS: Primary | ICD-10-CM

## 2021-02-03 DIAGNOSIS — D25.9 UTERINE LEIOMYOMA, UNSPECIFIED LOCATION: ICD-10-CM

## 2021-02-03 DIAGNOSIS — D25.1 INTRAMURAL, SUBMUCOUS, AND SUBSEROUS LEIOMYOMA OF UTERUS: Primary | ICD-10-CM

## 2021-02-03 DIAGNOSIS — D25.0 INTRAMURAL, SUBMUCOUS, AND SUBSEROUS LEIOMYOMA OF UTERUS: Primary | ICD-10-CM

## 2021-02-03 DIAGNOSIS — D25.9 UTERINE FIBROID: ICD-10-CM

## 2021-02-03 DIAGNOSIS — N93.9 ABNORMAL UTERINE BLEEDING: ICD-10-CM

## 2021-02-03 LAB
B-HCG UR QL: NEGATIVE
DEPRECATED RDW RBC AUTO: 44.1 FL (ref 37–54)
ERYTHROCYTE [DISTWIDTH] IN BLOOD BY AUTOMATED COUNT: 12.6 % (ref 12.3–15.4)
HCT VFR BLD AUTO: 37.3 % (ref 34–46.6)
HGB BLD-MCNC: 12.3 G/DL (ref 12–15.9)
INTERNAL NEGATIVE CONTROL: NEGATIVE
INTERNAL POSITIVE CONTROL: POSITIVE
Lab: NORMAL
MCH RBC QN AUTO: 31.3 PG (ref 26.6–33)
MCHC RBC AUTO-ENTMCNC: 33 G/DL (ref 31.5–35.7)
MCV RBC AUTO: 94.9 FL (ref 79–97)
PLATELET # BLD AUTO: 187 10*3/MM3 (ref 140–450)
PMV BLD AUTO: 10.7 FL (ref 6–12)
RBC # BLD AUTO: 3.93 10*6/MM3 (ref 3.77–5.28)
WBC # BLD AUTO: 13.86 10*3/MM3 (ref 3.4–10.8)

## 2021-02-03 PROCEDURE — 25010000002 FENTANYL CITRATE (PF) 100 MCG/2ML SOLUTION: Performed by: NURSE ANESTHETIST, CERTIFIED REGISTERED

## 2021-02-03 PROCEDURE — 58571 TLH W/T/O 250 G OR LESS: CPT | Performed by: OBSTETRICS & GYNECOLOGY

## 2021-02-03 PROCEDURE — 25010000002 PROPOFOL 10 MG/ML EMULSION: Performed by: NURSE ANESTHETIST, CERTIFIED REGISTERED

## 2021-02-03 PROCEDURE — 25010000003 CEFAZOLIN IN DEXTROSE 2-4 GM/100ML-% SOLUTION: Performed by: OBSTETRICS & GYNECOLOGY

## 2021-02-03 PROCEDURE — 81025 URINE PREGNANCY TEST: CPT | Performed by: ANESTHESIOLOGY

## 2021-02-03 PROCEDURE — 85027 COMPLETE CBC AUTOMATED: CPT | Performed by: OBSTETRICS & GYNECOLOGY

## 2021-02-03 PROCEDURE — 58571 TLH W/T/O 250 G OR LESS: CPT | Performed by: PHYSICIAN ASSISTANT

## 2021-02-03 PROCEDURE — 25010000002 DEXAMETHASONE PER 1 MG: Performed by: NURSE ANESTHETIST, CERTIFIED REGISTERED

## 2021-02-03 PROCEDURE — 25010000002 ONDANSETRON PER 1 MG: Performed by: NURSE ANESTHETIST, CERTIFIED REGISTERED

## 2021-02-03 PROCEDURE — 25010000003 LIDOCAINE 1 % SOLUTION: Performed by: NURSE ANESTHETIST, CERTIFIED REGISTERED

## 2021-02-03 PROCEDURE — 88307 TISSUE EXAM BY PATHOLOGIST: CPT | Performed by: OBSTETRICS & GYNECOLOGY

## 2021-02-03 PROCEDURE — 25010000002 NEOSTIGMINE 10 MG/10ML SOLUTION: Performed by: NURSE ANESTHETIST, CERTIFIED REGISTERED

## 2021-02-03 PROCEDURE — 25010000002 ONDANSETRON PER 1 MG: Performed by: OBSTETRICS & GYNECOLOGY

## 2021-02-03 RX ORDER — PHENAZOPYRIDINE HYDROCHLORIDE 100 MG/1
200 TABLET, FILM COATED ORAL ONCE
Status: COMPLETED | OUTPATIENT
Start: 2021-02-03 | End: 2021-02-03

## 2021-02-03 RX ORDER — DOCUSATE SODIUM 100 MG/1
100 CAPSULE, LIQUID FILLED ORAL 2 TIMES DAILY
Qty: 60 CAPSULE | Refills: 1 | Status: SHIPPED | OUTPATIENT
Start: 2021-02-03 | End: 2022-04-21

## 2021-02-03 RX ORDER — LIDOCAINE HYDROCHLORIDE 10 MG/ML
INJECTION, SOLUTION INFILTRATION; PERINEURAL AS NEEDED
Status: DISCONTINUED | OUTPATIENT
Start: 2021-02-03 | End: 2021-02-03 | Stop reason: SURG

## 2021-02-03 RX ORDER — SCOLOPAMINE TRANSDERMAL SYSTEM 1 MG/1
1 PATCH, EXTENDED RELEASE TRANSDERMAL CONTINUOUS
Status: DISCONTINUED | OUTPATIENT
Start: 2021-02-03 | End: 2021-02-04 | Stop reason: HOSPADM

## 2021-02-03 RX ORDER — PROMETHAZINE HYDROCHLORIDE 25 MG/1
25 SUPPOSITORY RECTAL ONCE AS NEEDED
Status: DISCONTINUED | OUTPATIENT
Start: 2021-02-03 | End: 2021-02-03 | Stop reason: HOSPADM

## 2021-02-03 RX ORDER — ONDANSETRON 2 MG/ML
4 INJECTION INTRAMUSCULAR; INTRAVENOUS ONCE AS NEEDED
Status: DISCONTINUED | OUTPATIENT
Start: 2021-02-03 | End: 2021-02-03 | Stop reason: HOSPADM

## 2021-02-03 RX ORDER — FENTANYL CITRATE 50 UG/ML
INJECTION, SOLUTION INTRAMUSCULAR; INTRAVENOUS AS NEEDED
Status: DISCONTINUED | OUTPATIENT
Start: 2021-02-03 | End: 2021-02-03 | Stop reason: SURG

## 2021-02-03 RX ORDER — ONDANSETRON 2 MG/ML
INJECTION INTRAMUSCULAR; INTRAVENOUS AS NEEDED
Status: DISCONTINUED | OUTPATIENT
Start: 2021-02-03 | End: 2021-02-03 | Stop reason: SURG

## 2021-02-03 RX ORDER — HYDROMORPHONE HYDROCHLORIDE 1 MG/ML
0.5 INJECTION, SOLUTION INTRAMUSCULAR; INTRAVENOUS; SUBCUTANEOUS
Status: DISCONTINUED | OUTPATIENT
Start: 2021-02-03 | End: 2021-02-03 | Stop reason: HOSPADM

## 2021-02-03 RX ORDER — ONDANSETRON 4 MG/1
4 TABLET, FILM COATED ORAL EVERY 6 HOURS PRN
Status: DISCONTINUED | OUTPATIENT
Start: 2021-02-03 | End: 2021-02-04 | Stop reason: HOSPADM

## 2021-02-03 RX ORDER — DOCUSATE SODIUM 100 MG/1
100 CAPSULE, LIQUID FILLED ORAL 2 TIMES DAILY
Status: DISCONTINUED | OUTPATIENT
Start: 2021-02-03 | End: 2021-02-04 | Stop reason: HOSPADM

## 2021-02-03 RX ORDER — SODIUM CHLORIDE, SODIUM LACTATE, POTASSIUM CHLORIDE, CALCIUM CHLORIDE 600; 310; 30; 20 MG/100ML; MG/100ML; MG/100ML; MG/100ML
9 INJECTION, SOLUTION INTRAVENOUS CONTINUOUS
Status: DISCONTINUED | OUTPATIENT
Start: 2021-02-03 | End: 2021-02-04 | Stop reason: HOSPADM

## 2021-02-03 RX ORDER — GABAPENTIN 300 MG/1
600 CAPSULE ORAL ONCE
Status: COMPLETED | OUTPATIENT
Start: 2021-02-03 | End: 2021-02-03

## 2021-02-03 RX ORDER — FAMOTIDINE 20 MG/1
20 TABLET, FILM COATED ORAL ONCE
Status: COMPLETED | OUTPATIENT
Start: 2021-02-03 | End: 2021-02-03

## 2021-02-03 RX ORDER — DEXAMETHASONE SODIUM PHOSPHATE 4 MG/ML
INJECTION, SOLUTION INTRA-ARTICULAR; INTRALESIONAL; INTRAMUSCULAR; INTRAVENOUS; SOFT TISSUE AS NEEDED
Status: DISCONTINUED | OUTPATIENT
Start: 2021-02-03 | End: 2021-02-03 | Stop reason: SURG

## 2021-02-03 RX ORDER — SODIUM CHLORIDE 9 MG/ML
INJECTION, SOLUTION INTRAVENOUS AS NEEDED
Status: DISCONTINUED | OUTPATIENT
Start: 2021-02-03 | End: 2021-02-03 | Stop reason: HOSPADM

## 2021-02-03 RX ORDER — CEFAZOLIN SODIUM 2 G/100ML
2 INJECTION, SOLUTION INTRAVENOUS ONCE
Status: COMPLETED | OUTPATIENT
Start: 2021-02-03 | End: 2021-02-03

## 2021-02-03 RX ORDER — NEOSTIGMINE METHYLSULFATE 1 MG/ML
INJECTION, SOLUTION INTRAVENOUS AS NEEDED
Status: DISCONTINUED | OUTPATIENT
Start: 2021-02-03 | End: 2021-02-03 | Stop reason: SURG

## 2021-02-03 RX ORDER — HYDROCODONE BITARTRATE AND ACETAMINOPHEN 5; 325 MG/1; MG/1
2 TABLET ORAL EVERY 4 HOURS PRN
Qty: 20 TABLET | Refills: 0 | Status: SHIPPED | OUTPATIENT
Start: 2021-02-03 | End: 2021-02-13

## 2021-02-03 RX ORDER — MIDAZOLAM HYDROCHLORIDE 1 MG/ML
1 INJECTION INTRAMUSCULAR; INTRAVENOUS
Status: DISCONTINUED | OUTPATIENT
Start: 2021-02-03 | End: 2021-02-03 | Stop reason: HOSPADM

## 2021-02-03 RX ORDER — GABAPENTIN 100 MG/1
100 CAPSULE ORAL 3 TIMES DAILY
Status: DISCONTINUED | OUTPATIENT
Start: 2021-02-03 | End: 2021-02-04 | Stop reason: HOSPADM

## 2021-02-03 RX ORDER — BUPIVACAINE HYDROCHLORIDE 2.5 MG/ML
INJECTION, SOLUTION EPIDURAL; INFILTRATION; INTRACAUDAL AS NEEDED
Status: DISCONTINUED | OUTPATIENT
Start: 2021-02-03 | End: 2021-02-03 | Stop reason: HOSPADM

## 2021-02-03 RX ORDER — FENTANYL CITRATE 50 UG/ML
50 INJECTION, SOLUTION INTRAMUSCULAR; INTRAVENOUS
Status: DISCONTINUED | OUTPATIENT
Start: 2021-02-03 | End: 2021-02-03 | Stop reason: HOSPADM

## 2021-02-03 RX ORDER — MELOXICAM 7.5 MG/1
7.5 TABLET ORAL DAILY
Status: COMPLETED | OUTPATIENT
Start: 2021-02-03 | End: 2021-02-04

## 2021-02-03 RX ORDER — HYDROCODONE BITARTRATE AND ACETAMINOPHEN 10; 325 MG/1; MG/1
1 TABLET ORAL EVERY 4 HOURS PRN
Status: DISCONTINUED | OUTPATIENT
Start: 2021-02-03 | End: 2021-02-04 | Stop reason: HOSPADM

## 2021-02-03 RX ORDER — ACETAMINOPHEN 500 MG
1000 TABLET ORAL ONCE
Status: COMPLETED | OUTPATIENT
Start: 2021-02-03 | End: 2021-02-03

## 2021-02-03 RX ORDER — HYDROCODONE BITARTRATE AND ACETAMINOPHEN 5; 325 MG/1; MG/1
1 TABLET ORAL EVERY 4 HOURS PRN
Status: DISCONTINUED | OUTPATIENT
Start: 2021-02-03 | End: 2021-02-04 | Stop reason: HOSPADM

## 2021-02-03 RX ORDER — HYDROCODONE BITARTRATE AND ACETAMINOPHEN 5; 325 MG/1; MG/1
1 TABLET ORAL ONCE AS NEEDED
Status: DISCONTINUED | OUTPATIENT
Start: 2021-02-03 | End: 2021-02-03 | Stop reason: HOSPADM

## 2021-02-03 RX ORDER — MIDAZOLAM HYDROCHLORIDE 1 MG/ML
2 INJECTION INTRAMUSCULAR; INTRAVENOUS
Status: DISCONTINUED | OUTPATIENT
Start: 2021-02-03 | End: 2021-02-03 | Stop reason: HOSPADM

## 2021-02-03 RX ORDER — IBUPROFEN 600 MG/1
600 TABLET ORAL EVERY 6 HOURS PRN
Qty: 30 TABLET | Refills: 0 | Status: SHIPPED | OUTPATIENT
Start: 2021-02-03 | End: 2022-04-21

## 2021-02-03 RX ORDER — PROPOFOL 10 MG/ML
VIAL (ML) INTRAVENOUS AS NEEDED
Status: DISCONTINUED | OUTPATIENT
Start: 2021-02-03 | End: 2021-02-03 | Stop reason: SURG

## 2021-02-03 RX ORDER — ONDANSETRON 2 MG/ML
4 INJECTION INTRAMUSCULAR; INTRAVENOUS EVERY 6 HOURS PRN
Status: DISCONTINUED | OUTPATIENT
Start: 2021-02-03 | End: 2021-02-04 | Stop reason: HOSPADM

## 2021-02-03 RX ORDER — ROCURONIUM BROMIDE 10 MG/ML
INJECTION, SOLUTION INTRAVENOUS AS NEEDED
Status: DISCONTINUED | OUTPATIENT
Start: 2021-02-03 | End: 2021-02-03 | Stop reason: SURG

## 2021-02-03 RX ORDER — LIDOCAINE HYDROCHLORIDE 10 MG/ML
0.5 INJECTION, SOLUTION EPIDURAL; INFILTRATION; INTRACAUDAL; PERINEURAL ONCE AS NEEDED
Status: COMPLETED | OUTPATIENT
Start: 2021-02-03 | End: 2021-02-03

## 2021-02-03 RX ORDER — PROMETHAZINE HYDROCHLORIDE 25 MG/1
25 TABLET ORAL ONCE AS NEEDED
Status: DISCONTINUED | OUTPATIENT
Start: 2021-02-03 | End: 2021-02-03 | Stop reason: HOSPADM

## 2021-02-03 RX ORDER — EPHEDRINE SULFATE 50 MG/ML
INJECTION, SOLUTION INTRAVENOUS AS NEEDED
Status: DISCONTINUED | OUTPATIENT
Start: 2021-02-03 | End: 2021-02-03 | Stop reason: SURG

## 2021-02-03 RX ORDER — MAGNESIUM HYDROXIDE 1200 MG/15ML
LIQUID ORAL AS NEEDED
Status: DISCONTINUED | OUTPATIENT
Start: 2021-02-03 | End: 2021-02-03 | Stop reason: HOSPADM

## 2021-02-03 RX ORDER — GLYCOPYRROLATE 0.2 MG/ML
INJECTION INTRAMUSCULAR; INTRAVENOUS AS NEEDED
Status: DISCONTINUED | OUTPATIENT
Start: 2021-02-03 | End: 2021-02-03 | Stop reason: SURG

## 2021-02-03 RX ORDER — DEXTROSE, SODIUM CHLORIDE, AND POTASSIUM CHLORIDE 5; .45; .15 G/100ML; G/100ML; G/100ML
100 INJECTION INTRAVENOUS CONTINUOUS
Status: DISCONTINUED | OUTPATIENT
Start: 2021-02-03 | End: 2021-02-04 | Stop reason: HOSPADM

## 2021-02-03 RX ADMIN — POTASSIUM CHLORIDE, DEXTROSE MONOHYDRATE AND SODIUM CHLORIDE 100 ML/HR: 150; 5; 450 INJECTION, SOLUTION INTRAVENOUS at 18:01

## 2021-02-03 RX ADMIN — PROPOFOL 200 MG: 10 INJECTION, EMULSION INTRAVENOUS at 14:04

## 2021-02-03 RX ADMIN — DOCUSATE SODIUM 100 MG: 100 CAPSULE ORAL at 20:15

## 2021-02-03 RX ADMIN — GABAPENTIN 600 MG: 300 CAPSULE ORAL at 09:08

## 2021-02-03 RX ADMIN — GABAPENTIN 100 MG: 100 CAPSULE ORAL at 21:38

## 2021-02-03 RX ADMIN — HYDROCODONE BITARTRATE AND ACETAMINOPHEN 1 TABLET: 5; 325 TABLET ORAL at 18:09

## 2021-02-03 RX ADMIN — LIDOCAINE HYDROCHLORIDE 50 MG: 10 INJECTION, SOLUTION INFILTRATION; PERINEURAL at 14:04

## 2021-02-03 RX ADMIN — EPHEDRINE SULFATE 10 MG: 50 INJECTION, SOLUTION INTRAVENOUS at 14:17

## 2021-02-03 RX ADMIN — ONDANSETRON 4 MG: 2 INJECTION INTRAMUSCULAR; INTRAVENOUS at 18:01

## 2021-02-03 RX ADMIN — SODIUM CHLORIDE, POTASSIUM CHLORIDE, SODIUM LACTATE AND CALCIUM CHLORIDE 9 ML/HR: 600; 310; 30; 20 INJECTION, SOLUTION INTRAVENOUS at 08:59

## 2021-02-03 RX ADMIN — MELOXICAM 7.5 MG: 7.5 TABLET ORAL at 18:01

## 2021-02-03 RX ADMIN — GLYCOPYRROLATE 0.4 MG: 0.4 INJECTION INTRAMUSCULAR; INTRAVENOUS at 15:51

## 2021-02-03 RX ADMIN — ACETAMINOPHEN 1000 MG: 500 TABLET ORAL at 09:08

## 2021-02-03 RX ADMIN — NEOSTIGMINE 3 MG: 1 INJECTION INTRAVENOUS at 15:51

## 2021-02-03 RX ADMIN — PHENAZOPYRIDINE HYDROCHLORIDE 200 MG: 100 TABLET ORAL at 09:08

## 2021-02-03 RX ADMIN — CEFAZOLIN SODIUM 2 G: 2 INJECTION, SOLUTION INTRAVENOUS at 14:00

## 2021-02-03 RX ADMIN — FENTANYL CITRATE 50 MCG: 0.05 INJECTION, SOLUTION INTRAMUSCULAR; INTRAVENOUS at 16:30

## 2021-02-03 RX ADMIN — FENTANYL CITRATE 100 MCG: 50 INJECTION, SOLUTION INTRAMUSCULAR; INTRAVENOUS at 14:04

## 2021-02-03 RX ADMIN — EPHEDRINE SULFATE 10 MG: 50 INJECTION, SOLUTION INTRAVENOUS at 14:53

## 2021-02-03 RX ADMIN — DEXAMETHASONE SODIUM PHOSPHATE 8 MG: 4 INJECTION, SOLUTION INTRA-ARTICULAR; INTRALESIONAL; INTRAMUSCULAR; INTRAVENOUS; SOFT TISSUE at 14:13

## 2021-02-03 RX ADMIN — LIDOCAINE HYDROCHLORIDE 0.5 ML: 10 INJECTION, SOLUTION EPIDURAL; INFILTRATION; INTRACAUDAL; PERINEURAL at 08:59

## 2021-02-03 RX ADMIN — ONDANSETRON 4 MG: 2 INJECTION INTRAMUSCULAR; INTRAVENOUS at 15:24

## 2021-02-03 RX ADMIN — HYDROCODONE BITARTRATE AND ACETAMINOPHEN 1 TABLET: 10; 325 TABLET ORAL at 22:24

## 2021-02-03 RX ADMIN — ROCURONIUM BROMIDE 20 MG: 10 INJECTION INTRAVENOUS at 14:46

## 2021-02-03 RX ADMIN — ROCURONIUM BROMIDE 50 MG: 10 INJECTION INTRAVENOUS at 14:04

## 2021-02-03 RX ADMIN — FAMOTIDINE 20 MG: 20 TABLET, FILM COATED ORAL at 09:08

## 2021-02-03 RX ADMIN — SCOPALAMINE 1 PATCH: 1 PATCH, EXTENDED RELEASE TRANSDERMAL at 09:08

## 2021-02-03 NOTE — ANESTHESIA PROCEDURE NOTES
Airway  Urgency: elective    Date/Time: 2/3/2021 2:06 PM  Airway not difficult    General Information and Staff    Patient location during procedure: OR  CRNA: Gladis Bronson CRNA    Indications and Patient Condition  Indications for airway management: airway protection    Preoxygenated: yes  MILS not maintained throughout  Mask difficulty assessment: 1 - vent by mask    Final Airway Details  Final airway type: endotracheal airway      Successful airway: ETT  Cuffed: yes   Successful intubation technique: direct laryngoscopy  Endotracheal tube insertion site: oral  Blade: Celio  Blade size: 3  ETT size (mm): 7.5  Cormack-Lehane Classification: grade I - full view of glottis  Placement verified by: chest auscultation and capnometry   Measured from: lips  ETT/EBT  to lips (cm): 20  Number of attempts at approach: 1  Assessment: lips, teeth, and gum same as pre-op and atraumatic intubation    Additional Comments  Negative epigastric sounds, Breath sound equal bilaterally with symmetric chest rise and fall

## 2021-02-03 NOTE — ANESTHESIA POSTPROCEDURE EVALUATION
Patient: Lluvia Kumar    Procedure Summary     Date: 02/03/21 Room / Location:  [facility] OR  /  [facility] OR    Anesthesia Start: 1400 Anesthesia Stop:     Procedure: TOTAL LAPAROSCOPIC HYSTERECTOMY BILATERAL SALPINGO-OOPHERECTOMY WITH DAVINCI ROBOT (N/A Abdomen) Diagnosis:     Surgeon: Sushma Acevedo MD Provider: Ru Carter MD    Anesthesia Type: general ASA Status: 1          Anesthesia Type: general    Vitals  Vitals Value Taken Time   /59 02/03/21 1610   Temp 97 °F (36.1 °C) 02/03/21 1609   Pulse 61 02/03/21 1612   Resp 16 02/03/21 1610   SpO2 97 % 02/03/21 1612   Vitals shown include unvalidated device data.        Post Anesthesia Care and Evaluation    Patient location during evaluation: PACU  Patient participation: complete - patient participated  Level of consciousness: awake and alert  Pain score: 0  Pain management: adequate  Airway patency: patent  Anesthetic complications: No anesthetic complications  PONV Status: none  Cardiovascular status: hemodynamically stable and acceptable  Respiratory status: nonlabored ventilation, acceptable and nasal cannula  Hydration status: acceptable

## 2021-02-03 NOTE — ANESTHESIA PREPROCEDURE EVALUATION
Anesthesia Evaluation     Patient summary reviewed and Nursing notes reviewed   no history of anesthetic complications:  NPO Solid Status: > 8 hours  NPO Liquid Status: > 2 hours           Airway   Mallampati: II  TM distance: >3 FB  Neck ROM: full  No difficulty expected  Dental - normal exam     Pulmonary - negative pulmonary ROS and normal exam    breath sounds clear to auscultation  Cardiovascular - negative cardio ROS and normal exam    ECG reviewed  Rhythm: regular  Rate: normal        Neuro/Psych- negative ROS  GI/Hepatic/Renal/Endo - negative ROS     Musculoskeletal (-) negative ROS    Abdominal    Substance History - negative use     OB/GYN          Other - negative ROS                       Anesthesia Plan    ASA 1     general     intravenous induction     Anesthetic plan, all risks, benefits, and alternatives have been provided, discussed and informed consent has been obtained with: patient.    Plan discussed with CRNA.

## 2021-02-04 VITALS
HEART RATE: 74 BPM | RESPIRATION RATE: 18 BRPM | TEMPERATURE: 99.3 F | OXYGEN SATURATION: 95 % | HEIGHT: 65 IN | DIASTOLIC BLOOD PRESSURE: 66 MMHG | WEIGHT: 189 LBS | SYSTOLIC BLOOD PRESSURE: 110 MMHG | BODY MASS INDEX: 31.49 KG/M2

## 2021-02-04 PROCEDURE — 99024 POSTOP FOLLOW-UP VISIT: CPT | Performed by: OBSTETRICS & GYNECOLOGY

## 2021-02-04 RX ADMIN — POTASSIUM CHLORIDE, DEXTROSE MONOHYDRATE AND SODIUM CHLORIDE 100 ML/HR: 150; 5; 450 INJECTION, SOLUTION INTRAVENOUS at 01:53

## 2021-02-04 RX ADMIN — DOCUSATE SODIUM 100 MG: 100 CAPSULE ORAL at 08:45

## 2021-02-04 RX ADMIN — MELOXICAM 7.5 MG: 7.5 TABLET ORAL at 08:45

## 2021-02-04 RX ADMIN — HYDROCODONE BITARTRATE AND ACETAMINOPHEN 1 TABLET: 5; 325 TABLET ORAL at 10:51

## 2021-02-04 RX ADMIN — HYDROCODONE BITARTRATE AND ACETAMINOPHEN 1 TABLET: 10; 325 TABLET ORAL at 06:01

## 2021-02-04 RX ADMIN — GABAPENTIN 100 MG: 100 CAPSULE ORAL at 08:45

## 2021-02-05 ENCOUNTER — TELEPHONE (OUTPATIENT)
Dept: OBSTETRICS AND GYNECOLOGY | Facility: CLINIC | Age: 51
End: 2021-02-05

## 2021-02-05 NOTE — TELEPHONE ENCOUNTER
Pt left vm stating she had surgery a couple days ago and wasn't sure if she was needing a antibiotic prescription.

## 2021-02-05 NOTE — TELEPHONE ENCOUNTER
Had a TLH and ancef during surgery. Informed not standard that they always send in a Rx. She VU. Denies problems.

## 2021-02-08 LAB
CYTO UR: NORMAL
LAB AP CASE REPORT: NORMAL
LAB AP CLINICAL INFORMATION: NORMAL
PATH REPORT.FINAL DX SPEC: NORMAL
PATH REPORT.GROSS SPEC: NORMAL

## 2021-02-17 ENCOUNTER — OFFICE VISIT (OUTPATIENT)
Dept: OBSTETRICS AND GYNECOLOGY | Facility: CLINIC | Age: 51
End: 2021-02-17

## 2021-02-17 VITALS
BODY MASS INDEX: 31.49 KG/M2 | HEIGHT: 65 IN | DIASTOLIC BLOOD PRESSURE: 80 MMHG | SYSTOLIC BLOOD PRESSURE: 126 MMHG | WEIGHT: 189 LBS

## 2021-02-17 DIAGNOSIS — N93.9 ABNORMAL UTERINE BLEEDING (AUB): ICD-10-CM

## 2021-02-17 DIAGNOSIS — D25.1 INTRAMURAL, SUBMUCOUS, AND SUBSEROUS LEIOMYOMA OF UTERUS: Primary | ICD-10-CM

## 2021-02-17 DIAGNOSIS — D25.2 INTRAMURAL, SUBMUCOUS, AND SUBSEROUS LEIOMYOMA OF UTERUS: Primary | ICD-10-CM

## 2021-02-17 DIAGNOSIS — Z09 POSTOPERATIVE FOLLOW-UP: ICD-10-CM

## 2021-02-17 DIAGNOSIS — D25.0 INTRAMURAL, SUBMUCOUS, AND SUBSEROUS LEIOMYOMA OF UTERUS: Primary | ICD-10-CM

## 2021-02-17 PROCEDURE — 99024 POSTOP FOLLOW-UP VISIT: CPT | Performed by: OBSTETRICS & GYNECOLOGY

## 2021-02-17 NOTE — PROGRESS NOTES
"Subjective   Chief Complaint   Patient presents with   • Post-op     TLH w/BSO     Lluvia Kumar is a 50 y.o. year old  presenting to be seen for her post-operative visit.  Currently she reports no problems with eating, bowel movements, voiding, or wound drainage and pain is well controlled.  She has no vaginal bleeding.  Still some constipation.    The pathology results from her procedure are in Lluvia's record and are benign.      OTHER THINGS SHE WANTS TO DISCUSS TODAY:  Nothing else    The following portions of the patient's history were reviewed and updated as appropriate:current medications, allergies, past family history, past medical history, past social history and past surgical history       Objective   /80 (BP Location: Right arm, Patient Position: Sitting)   Ht 165.1 cm (65\")   Wt 85.7 kg (189 lb)   LMP  (LMP Unknown)   Breastfeeding No   BMI 31.45 kg/m²     General:  well developed; well nourished  no acute distress   Abdomen: soft, non-tender; no masses  no umbilical or inguinal hernias are present  Incisions clean dry and intact   Pelvis: Not performed.          Assessment   1. S/P Robotic-assisted TLH with bilateral salpingo-oophorectomy      Plan   1. Avoid tampons, douching and intercourse  2. OK to drive  3. Lifting restriction of no more than 20 pounds  4. May return to limited activity immediately   5. The importance of keeping all planned follow-up and taking all medications as prescribed was emphasized.  6. Follow up 4wk    No orders of the defined types were placed in this encounter.         This note was electronically signed.    Sushma Acevedo MD  2021    Note: Speech recognition transcription software may have been used to create portions of this document.  An attempt at proofreading has been made but errors in transcription could still be present.  "

## 2021-03-17 ENCOUNTER — OFFICE VISIT (OUTPATIENT)
Dept: OBSTETRICS AND GYNECOLOGY | Facility: CLINIC | Age: 51
End: 2021-03-17

## 2021-03-17 VITALS
WEIGHT: 193.4 LBS | DIASTOLIC BLOOD PRESSURE: 80 MMHG | BODY MASS INDEX: 32.22 KG/M2 | HEIGHT: 65 IN | TEMPERATURE: 97.8 F | SYSTOLIC BLOOD PRESSURE: 108 MMHG

## 2021-03-17 DIAGNOSIS — Z09 POSTOPERATIVE FOLLOW-UP: Primary | ICD-10-CM

## 2021-03-17 PROCEDURE — 99024 POSTOP FOLLOW-UP VISIT: CPT | Performed by: OBSTETRICS & GYNECOLOGY

## 2021-03-17 NOTE — PROGRESS NOTES
"Subjective   Chief Complaint   Patient presents with   • Post-op     TLH w/ BSO with Davinci Robot     Lluvia Kumar is a 50 y.o. year old  presenting to be seen for her post-operative visit. Patient underwent TLH w/ BSO with Davinci Robot on 2021 for dx of AUB. Currently she reports no problems with eating, bowel movements, voiding, or wound drainage and pain is well controlled. Patient would like to discuss whether she needs to be on HTR or not. Patient mentions that she is needing a letter to return to work with/without restrictions. Patient mentions that her L wrist where her IV was placed now has a knot and is significantly more painful that usual. She desires to have this examined today.     The pathology results from her procedure are in Lluvia's record and are Multiple leiomyomata, intramural and subserosal. Endometrial basalis without atypia, hyperplasia, or carcinoma. Physiologic ovaries and unremarkable fallopian tubes with benign paratubal cysts. Mild chronic cervicitis.     OTHER THINGS SHE WANTS TO DISCUSS TODAY:  Nothing else    The following portions of the patient's history were reviewed and updated as appropriate:current medications and allergies       Objective   /80 (BP Location: Right arm, Patient Position: Sitting, Cuff Size: Adult)   Temp 97.8 °F (36.6 °C)   Ht 165.1 cm (65\")   Wt 87.7 kg (193 lb 6.4 oz)   LMP  (LMP Unknown)   Breastfeeding No   BMI 32.18 kg/m²     General:  well developed; well nourished  no acute distress   Abdomen: soft, non-tender; no masses  no umbilical or inguinal hernias are present  no hepato-splenomegaly   Pelvis: Clinical staff was present for exam  vaginal cuff well-healed; bimanual exam benign          Assessment   1. S/P TLH with bilateral salpingo-oophorectomy      Plan   1. May return to full activity with no restrictions  2. The importance of keeping all planned follow-up and taking all medications as prescribed was " emphasized.  3. We reviewed risks/benefits of HRT as well as antidepressants and other treatments for vasomotor symptoms of menopause.  Patient would like to try estrogen.  I encouraged a form that is not a pill.    New Medications Ordered This Visit   Medications   • estradiol (Climara) 0.025 MG/24HR patch     Sig: Place 1 patch on the skin as directed by provider 1 (One) Time Per Week.     Dispense:  12 patch     Refill:  4          This note was electronically signed.    Sushma Acevedo MD  March 21, 2021    Note: Speech recognition transcription software may have been used to create portions of this document.  An attempt at proofreading has been made but errors in transcription could still be present.

## 2021-12-21 ENCOUNTER — TRANSCRIBE ORDERS (OUTPATIENT)
Dept: ADMINISTRATIVE | Facility: HOSPITAL | Age: 51
End: 2021-12-21

## 2021-12-21 DIAGNOSIS — Z12.31 VISIT FOR SCREENING MAMMOGRAM: Primary | ICD-10-CM

## 2022-02-01 ENCOUNTER — HOSPITAL ENCOUNTER (OUTPATIENT)
Dept: MAMMOGRAPHY | Facility: HOSPITAL | Age: 52
Discharge: HOME | End: 2022-02-01
Admitting: PHYSICIAN ASSISTANT

## 2022-02-01 DIAGNOSIS — Z12.31 VISIT FOR SCREENING MAMMOGRAM: ICD-10-CM

## 2022-02-01 PROCEDURE — 77067 SCR MAMMO BI INCL CAD: CPT | Performed by: RADIOLOGY

## 2022-02-01 PROCEDURE — 77067 SCR MAMMO BI INCL CAD: CPT

## 2022-02-01 PROCEDURE — 77063 BREAST TOMOSYNTHESIS BI: CPT

## 2022-02-01 PROCEDURE — 77063 BREAST TOMOSYNTHESIS BI: CPT | Performed by: RADIOLOGY

## 2022-03-11 PROCEDURE — U0004 COV-19 TEST NON-CDC HGH THRU: HCPCS | Performed by: FAMILY MEDICINE

## 2023-06-06 ENCOUNTER — TRANSCRIBE ORDERS (OUTPATIENT)
Dept: ADMINISTRATIVE | Facility: HOSPITAL | Age: 53
End: 2023-06-06
Payer: COMMERCIAL

## 2023-06-06 DIAGNOSIS — Z12.31 VISIT FOR SCREENING MAMMOGRAM: Primary | ICD-10-CM

## 2023-06-13 ENCOUNTER — HOSPITAL ENCOUNTER (OUTPATIENT)
Dept: MAMMOGRAPHY | Facility: HOSPITAL | Age: 53
Discharge: HOME | End: 2023-06-13
Admitting: PHYSICIAN ASSISTANT
Payer: COMMERCIAL

## 2023-06-13 DIAGNOSIS — Z12.31 VISIT FOR SCREENING MAMMOGRAM: ICD-10-CM

## 2023-06-13 PROCEDURE — 77067 SCR MAMMO BI INCL CAD: CPT

## 2023-06-13 PROCEDURE — 77063 BREAST TOMOSYNTHESIS BI: CPT

## 2023-11-22 ENCOUNTER — APPOINTMENT (OUTPATIENT)
Dept: GENERAL RADIOLOGY | Facility: HOSPITAL | Age: 53
End: 2023-11-22
Payer: COMMERCIAL

## 2023-11-22 ENCOUNTER — HOSPITAL ENCOUNTER (EMERGENCY)
Facility: HOSPITAL | Age: 53
Discharge: HOME | End: 2023-11-23
Attending: EMERGENCY MEDICINE
Payer: COMMERCIAL

## 2023-11-22 DIAGNOSIS — S93.05XA DISLOCATION OF LEFT ANKLE JOINT, INITIAL ENCOUNTER: ICD-10-CM

## 2023-11-22 DIAGNOSIS — S82.842A ANKLE FRACTURE, BIMALLEOLAR, CLOSED, LEFT, INITIAL ENCOUNTER: Primary | ICD-10-CM

## 2023-11-22 PROCEDURE — 25010000002 FENTANYL CITRATE (PF) 50 MCG/ML SOLUTION: Performed by: EMERGENCY MEDICINE

## 2023-11-22 PROCEDURE — 73610 X-RAY EXAM OF ANKLE: CPT

## 2023-11-22 PROCEDURE — 25010000002 MIDAZOLAM PER 1 MG

## 2023-11-22 PROCEDURE — 25010000002 PROPOFOL 10 MG/ML EMULSION: Performed by: EMERGENCY MEDICINE

## 2023-11-22 PROCEDURE — 99283 EMERGENCY DEPT VISIT LOW MDM: CPT

## 2023-11-22 PROCEDURE — 96375 TX/PRO/DX INJ NEW DRUG ADDON: CPT

## 2023-11-22 PROCEDURE — 25010000002 ONDANSETRON PER 1 MG: Performed by: EMERGENCY MEDICINE

## 2023-11-22 PROCEDURE — 25010000002 MIDAZOLAM PER 1 MG: Performed by: EMERGENCY MEDICINE

## 2023-11-22 PROCEDURE — 96374 THER/PROPH/DIAG INJ IV PUSH: CPT

## 2023-11-22 RX ORDER — HYDROMORPHONE HYDROCHLORIDE 1 MG/ML
0.25 INJECTION, SOLUTION INTRAMUSCULAR; INTRAVENOUS; SUBCUTANEOUS ONCE
Status: COMPLETED | OUTPATIENT
Start: 2023-11-22 | End: 2023-11-23

## 2023-11-22 RX ORDER — FENTANYL CITRATE 50 UG/ML
25 INJECTION, SOLUTION INTRAMUSCULAR; INTRAVENOUS ONCE
Status: COMPLETED | OUTPATIENT
Start: 2023-11-22 | End: 2023-11-22

## 2023-11-22 RX ORDER — HYDROMORPHONE HYDROCHLORIDE 1 MG/ML
0.5 INJECTION, SOLUTION INTRAMUSCULAR; INTRAVENOUS; SUBCUTANEOUS ONCE
Status: DISCONTINUED | OUTPATIENT
Start: 2023-11-22 | End: 2023-11-22

## 2023-11-22 RX ORDER — PROPOFOL 10 MG/ML
VIAL (ML) INTRAVENOUS
Status: COMPLETED | OUTPATIENT
Start: 2023-11-22 | End: 2023-11-22

## 2023-11-22 RX ORDER — ONDANSETRON 2 MG/ML
4 INJECTION INTRAMUSCULAR; INTRAVENOUS ONCE
Status: COMPLETED | OUTPATIENT
Start: 2023-11-22 | End: 2023-11-22

## 2023-11-22 RX ORDER — MIDAZOLAM HYDROCHLORIDE 1 MG/ML
INJECTION INTRAMUSCULAR; INTRAVENOUS
Status: COMPLETED
Start: 2023-11-22 | End: 2023-11-22

## 2023-11-22 RX ORDER — MIDAZOLAM HYDROCHLORIDE 1 MG/ML
INJECTION INTRAMUSCULAR; INTRAVENOUS
Status: COMPLETED | OUTPATIENT
Start: 2023-11-22 | End: 2023-11-22

## 2023-11-22 RX ORDER — PROPOFOL 10 MG/ML
20-200 VIAL (ML) INTRAVENOUS ONCE
Status: DISCONTINUED | OUTPATIENT
Start: 2023-11-22 | End: 2023-11-23 | Stop reason: HOSPADM

## 2023-11-22 RX ADMIN — PROPOFOL 20 MG: 10 INJECTION, EMULSION INTRAVENOUS at 23:25

## 2023-11-22 RX ADMIN — PROPOFOL 15 MG: 10 INJECTION, EMULSION INTRAVENOUS at 23:19

## 2023-11-22 RX ADMIN — MIDAZOLAM HYDROCHLORIDE 2 MG: 1 INJECTION, SOLUTION INTRAMUSCULAR; INTRAVENOUS at 23:23

## 2023-11-22 RX ADMIN — ONDANSETRON 4 MG: 2 INJECTION INTRAMUSCULAR; INTRAVENOUS at 21:28

## 2023-11-22 RX ADMIN — FENTANYL CITRATE 25 MCG: 50 INJECTION, SOLUTION INTRAMUSCULAR; INTRAVENOUS at 21:29

## 2023-11-22 RX ADMIN — PROPOFOL 10 MG: 10 INJECTION, EMULSION INTRAVENOUS at 23:21

## 2023-11-22 RX ADMIN — MIDAZOLAM HYDROCHLORIDE 2 MG: 1 INJECTION, SOLUTION INTRAMUSCULAR; INTRAVENOUS at 23:22

## 2023-11-22 RX ADMIN — PROPOFOL 45 MG: 10 INJECTION, EMULSION INTRAVENOUS at 23:16

## 2023-11-22 RX ADMIN — PROPOFOL 10 MG: 10 INJECTION, EMULSION INTRAVENOUS at 23:28

## 2023-11-22 RX ADMIN — PROPOFOL 15 MG: 10 INJECTION, EMULSION INTRAVENOUS at 23:20

## 2023-11-22 RX ADMIN — PROPOFOL 10 MG: 10 INJECTION, EMULSION INTRAVENOUS at 23:23

## 2023-11-22 RX ADMIN — PROPOFOL 15 MG: 10 INJECTION, EMULSION INTRAVENOUS at 23:17

## 2023-11-23 VITALS
WEIGHT: 200 LBS | DIASTOLIC BLOOD PRESSURE: 81 MMHG | BODY MASS INDEX: 33.32 KG/M2 | RESPIRATION RATE: 18 BRPM | HEART RATE: 78 BPM | OXYGEN SATURATION: 97 % | TEMPERATURE: 98.8 F | HEIGHT: 65 IN | SYSTOLIC BLOOD PRESSURE: 125 MMHG

## 2023-11-23 PROCEDURE — 96375 TX/PRO/DX INJ NEW DRUG ADDON: CPT

## 2023-11-23 PROCEDURE — 25010000002 HYDROMORPHONE PER 4 MG: Performed by: EMERGENCY MEDICINE

## 2023-11-23 RX ORDER — OXYCODONE AND ACETAMINOPHEN 7.5; 325 MG/1; MG/1
1 TABLET ORAL EVERY 6 HOURS PRN
Qty: 12 TABLET | Refills: 0 | Status: SHIPPED | OUTPATIENT
Start: 2023-11-23 | End: 2023-12-06 | Stop reason: HOSPADM

## 2023-11-23 RX ORDER — ONDANSETRON 4 MG/1
4 TABLET, ORALLY DISINTEGRATING ORAL EVERY 6 HOURS PRN
Qty: 12 TABLET | Refills: 0 | Status: SHIPPED | OUTPATIENT
Start: 2023-11-23 | End: 2023-12-06 | Stop reason: HOSPADM

## 2023-11-23 RX ADMIN — HYDROMORPHONE HYDROCHLORIDE 0.25 MG: 1 INJECTION, SOLUTION INTRAMUSCULAR; INTRAVENOUS; SUBCUTANEOUS at 00:33

## 2023-11-23 NOTE — ED PROVIDER NOTES
EMERGENCY DEPARTMENT ENCOUNTER    Pt Name: Lluvia Kumar  MRN: [id number]  Pt :   1970  Room Number:    Date of encounter:  2023  PCP: Kiesha Berger PA  ED Provider: Westley Nunez MD    Historian: Patient and paramedics      HPI:  Chief Complaint: Left ankle pain        Context: Lluvia Kumar is a 53 y.o. female who presents to the ED c/o left ankle pain after stepping out of her vehicle and having her left foot slipped out from underneath her as she stepped onto cow dung.  She slid downward and injured the left ankle only.  She denies pain in her mid and proximal shin region as well as knee, hip, spine.  She denies loss of sensation and ability to move her toes.      PAST MEDICAL HISTORY  Past Medical History:   Diagnosis Date    Anemia     Anxiety     Breast lump     right    Fibrocystic breast     Fibroids     GERD (gastroesophageal reflux disease)     History of transfusion     in college needed transfusion due to anemia    Ovarian cyst     Seasonal allergies          PAST SURGICAL HISTORY  Past Surgical History:   Procedure Laterality Date    BREAST BIOPSY Right     2001    BREAST LUMPECTOMY Right     had 2 removed    CARPAL TUNNEL RELEASE       SECTION  ,     COLONOSCOPY      HYSTERECTOMY      OOPHORECTOMY      TONSILLECTOMY  1982    TOTAL LAPAROSCOPIC HYSTERECTOMY SALPINGECTOMY N/A 2021    Procedure: TOTAL LAPAROSCOPIC HYSTERECTOMY BILATERAL SALPINGO-OOPHERECTOMY WITH DAVINCI ROBOT;  Surgeon: Sushma Acevedo MD;  Location[facility];  [facility];  Laterality: N/A;    WISDOM TOOTH EXTRACTION           FAMILY HISTORY  Family History   Problem Relation Age of Onset    Diabetes type II Mother     Hypertension Mother     Breast cancer Paternal Grandmother 60    Aneurysm Paternal Grandfather     Breast cancer Paternal Aunt     Breast cancer Paternal Aunt     Breast cancer Paternal Aunt     Ovarian cancer Neg Hx          SOCIAL  HISTORY  Social History     Socioeconomic History    Marital status:    Tobacco Use    Smoking status: Former     Packs/day: 1.00     Years: 10.00     Additional pack years: 0.00     Total pack years: 10.00     Types: Cigarettes     Quit date:      Years since quittin.9    Smokeless tobacco: Never   Vaping Use    Vaping Use: Never used   Substance and Sexual Activity    Alcohol use: Yes     Alcohol/week: 2.0 standard drinks of alcohol     Types: 2 Glasses of wine per week     Comment: weekly social use    Drug use: Never    Sexual activity: Yes     Partners: Male     Birth control/protection: Surgical         ALLERGIES  Patient has no known allergies.        REVIEW OF SYSTEMS  Review of Systems       All systems reviewed and negative except for those discussed in HPI.       PHYSICAL EXAM    I have reviewed the triage vital signs and nursing notes.    ED Triage Vitals   Temp Pulse Resp BP SpO2   -- -- -- -- --      Temp src Heart Rate Source Patient Position BP Location FiO2 (%)   -- -- -- -- --       Physical Exam  GENERAL:   Appears quite uncomfortable as I greet her upon arrival in the emergency department with paramedics at her side.  HENT: Nares patent.  No signs of craniofacial trauma  EYES: No scleral icterus.  CV: Regular rhythm, regular rate.  2+ dorsalis pedis pulse left lower extremity.  RESPIRATORY: Normal effort.  No audible wheezes, rales or rhonchi.  ABDOMEN: Soft, nontender to deep palpation  MUSCULOSKELETAL: Left foot is laterally displaced as compared to normal anatomic positioning.  Soft tissue swelling in the left ankle with significant tenderness with any movements whatsoever.  NEURO: Alert, moves all extremities, follows commands.  Fine touch and motor intact distal to the injury.  SKIN: Warm, dry, no rash visualized.  Intact throughout.      LAB RESULTS  No results found for this or any previous visit (from the past 24 hour(s)).    If labs were ordered, I independently reviewed  the results and considered them in treating the patient.        RADIOLOGY  XR Ankle 3+ View Left    Result Date: 11/23/2023  XR ANKLE 3+ VW LEFT Date of Exam: 11/22/2023 11:42 PM EST Indication: post reduction Comparison: 11/22/2023 and 2147 hours Findings: Interval reduction of trimalleolar fracture dislocation. Alignment appears near anatomic with mild displacement of the trimalleolar fracture components. The ankle mortise is symmetric and the talar dome appears intact. There is generalized soft tissue swelling at the ankle and there has been interval casting. No new abnormalities seen.     Impression: Interval reduction of trimalleolar fracture dislocation with near-anatomic alignment. Electronically Signed: Rory Isaac MD  11/23/2023 12:13 AM EST  Workstation ID: [id number]    XR Ankle 3+ View Left    Result Date: 11/22/2023  XR ANKLE 3+ VW LEFT Date of Exam: 11/22/2023 9:38 PM EST Indication: fx dislocation Comparison: None available. Findings: Markedly displaced left medial and lateral malleolar fractures are visualized. There is lateral talar dislocation. Regional soft tissue swelling is visualized..     Impression: Markedly displaced left bimalleolar fracture dislocation. Electronically Signed: Anand Mosqueda MD  11/22/2023 10:35 PM EST  Workstation ID: [id number]     I ordered and independently reviewed the above noted radiographic studies.      I viewed images of left ankle radiographs both pre and post reduction which showed bimalleolar fracture dislocation initially and then good alignment postreduction per my independent interpretation.    See radiologist's dictation for official interpretation.        PROCEDURES    Procedural Sedation    Date/Time: 11/23/2023 12:21 AM    Performed by: Westley Nunez MD  Authorized by: Westley Nunez MD    Consent:     Consent obtained:  Verbal and written    Consent given by:  Patient    Risks, benefits, and alternatives were discussed: yes      Risks discussed:   Allergic reaction, dysrhythmia, inadequate sedation, nausea, vomiting, prolonged hypoxia resulting in organ damage and respiratory compromise necessitating ventilatory assistance and intubation  Universal protocol:     Procedure explained and questions answered to patient or proxy's satisfaction: yes      Relevant documents present and verified: yes      Test results available: yes      Immediately prior to procedure, a time out was called: yes      Patient identity confirmed:  Verbally with patient  Indications:     Procedure performed:  Dislocation reduction    Procedure necessitating sedation performed by:  Physician performing sedation    Intended level of sedation:  Moderate  Pre-sedation assessment:     Time since last food or drink:  2 hours    ASA classification: class 2 - patient with mild systemic disease      Mouth opening:  3 or more finger widths    Thyromental distance:  3 finger widths    Mallampati score:  III - soft palate, base of uvula visible    Neck mobility: normal      Pre-sedation assessments completed and reviewed: airway patency, anesthesia/sedation history, cardiovascular function, hydration status, mental status, nausea/vomiting, pain level, respiratory function and temperature      History of difficult intubation: no    Immediate pre-procedure details:     Reassessment: Patient reassessed immediately prior to procedure      Reviewed: vital signs, relevant labs/tests and NPO status      Verified: bag valve mask available, emergency equipment available, intubation equipment available, IV patency confirmed, oxygen available and suction available    Procedure details (see MAR for exact dosages):     Sedation start time:  11/22/2023 11:16 PM    Preoxygenation:  Nonrebreather mask    Intra-procedure monitoring:  Blood pressure monitoring, continuous capnometry, frequent LOC assessments, cardiac monitor, continuous pulse oximetry and frequent vital sign checks    Intra-procedure events: none       Sedation end time:  11/22/2023 11:37 PM  Post-procedure details:     Post-sedation assessment completed:  11/23/2023 12:22 AM    Attendance: Constant attendance by certified staff until patient recovered      Recovery: Patient returned to pre-procedure baseline      Post-sedation assessments completed and reviewed: airway patency, cardiovascular function, hydration status, mental status, nausea/vomiting, pain level and respiratory function      Patient is stable for discharge or admission: yes      Procedure completion:  Tolerated well, no immediate complications      No orders to display       MEDICATIONS GIVEN IN ER    Medications   Propofol (DIPRIVAN) injection  mg (60 mg Intravenous Not Given 11/22/23 2303)   Propofol (DIPRIVAN) injection (10 mg Intravenous Given 11/22/23 2328)   midazolam (VERSED) injection (2 mg Intravenous Given 11/22/23 2323)   HYDROmorphone (DILAUDID) injection 0.25 mg (has no administration in time range)   fentaNYL citrate (PF) (SUBLIMAZE) injection 25 mcg (25 mcg Intravenous Given 11/22/23 2129)   ondansetron (ZOFRAN) injection 4 mg (4 mg Intravenous Given 11/22/23 2128)         MEDICAL DECISION MAKING, PROGRESS, and CONSULTS    All labs, if obtained, have been independently reviewed by me.  All radiology studies, if obtained, have been reviewed by me and the radiologist dictating the report.  All EKG's, if obtained, have been independently viewed and interpreted by me/my attending physician.      Discussion below represents my analysis of pertinent findings related to patient's condition, differential diagnosis, treatment plan and final disposition.                         Differential diagnosis:    Fracture dislocation.      Additional sources:    - Discussed/ obtained information from independent historians: I spoke with paramedics at bedside.    - External (non-ED) record review: Reviewed multiple records on this patient to include prior CT scan of the abdomen pelvis which  showed no evidence of ureteral stones dated 12/26/2020.    I reviewed discharge summary dated 2/4/2021 when the patient was admitted for laparoscopic hysterectomy.    - Chronic or social conditions impacting care: .  Helps work a cattle ranch.    - Shared decision making: Patient in full agreement with current plans for evaluation and treatment.      Orders placed during this visit:  Orders Placed This Encounter   Procedures    Procedural Sedation    [facility] 11.  Crutches    XR Ankle 3+ View Left    XR Ankle 3+ View Left    Set up and consent for left ankle fx/dislocation reduction with procedural sedation and splint application  Misc Nursing Order (Specify)         Additional orders considered but not ordered:  CT scan of the left ankle.    ED Course:    Consultants:                  Shared Decision Making:  After my consideration of clinical presentation and any laboratory/radiology studies obtained, I discussed the findings with the patient/patient representative who is in agreement with the treatment plan and the final disposition.   Risks and benefits of discharge and/or observation/admission were discussed.       AS OF 00:23 EST VITALS:    BP - 125/81  HR - 80  TEMP - 98.8 °F (37.1 °C) (Oral)  O2 SATS - 94%      TOD reviewed by Westley Nunez MD           DIAGNOSIS  Final diagnoses:   Ankle fracture, bimalleolar, closed, left, initial encounter   Dislocation of left ankle joint, initial encounter         DISPOSITION  DISCHARGE    Patient discharged in stable condition.    Reviewed implications of results, diagnosis, meds, responsibility to follow up, warning signs and symptoms of possible worsening, potential complications and reasons to return to ER.    Patient/Family voiced understanding of above instructions.    Discussed plan for discharge, as there is no emergent indication for admission.  Pt/family is agreeable and understands need for follow up and possible repeat testing.   Pt/family is aware that discharge does not mean that nothing is wrong but that it indicates no emergency is currently present that requires admission and they must continue care with follow-up as given below or with a physician of their choice.     FOLLOW-UP  Garrett Avalos MD  [address]  [address]  [place] [id number] [phone]      NEXT AVAILABLE APPOINTMENT - RECHECK OF CONDITION    Kiesha Berger, PA  [address]  [place] [address] [phone]          [facility] EMERGENCY DEPARTMENT  [address] [place] [id number] [phone]    IF YOU HAVE ANY CONCERN OF WORSENING CONDITION         Medication List        New Prescriptions      ondansetron ODT 4 MG disintegrating tablet  Commonly known as: ZOFRAN-ODT  Place 1 tablet on the tongue Every 6 (Six) Hours As Needed for Nausea or Vomiting. As needed for nausea     oxyCODONE-acetaminophen 7.5-325 MG per tablet  Commonly known as: PERCOCET  Take 1 tablet by mouth Every 6 (Six) Hours As Needed for Severe Pain.               Where to Get Your Medications        These medications were sent to [facility]/pharmacy #[id number] - [place], [place] - [address]  - [phone]  - [phone] FX  [address] [address]      Hours: 24-hours Phone: [phone]   ondansetron ODT 4 MG disintegrating tablet  oxyCODONE-acetaminophen 7.5-325 MG per tablet             Please note that portions of this document were completed with voice recognition software.        Westley Nunez MD  11/23/23 3306

## 2023-11-29 ENCOUNTER — OFFICE VISIT (OUTPATIENT)
Dept: ORTHOPEDIC SURGERY | Facility: CLINIC | Age: 53
End: 2023-11-29
Payer: COMMERCIAL

## 2023-11-29 ENCOUNTER — PREP FOR SURGERY (OUTPATIENT)
Dept: OTHER | Facility: HOSPITAL | Age: 53
End: 2023-11-29
Payer: COMMERCIAL

## 2023-11-29 VITALS
WEIGHT: 199.2 LBS | SYSTOLIC BLOOD PRESSURE: 142 MMHG | HEIGHT: 65 IN | BODY MASS INDEX: 33.19 KG/M2 | DIASTOLIC BLOOD PRESSURE: 84 MMHG

## 2023-11-29 DIAGNOSIS — S82.842A ANKLE FRACTURE, BIMALLEOLAR, CLOSED, LEFT, INITIAL ENCOUNTER: Primary | ICD-10-CM

## 2023-11-29 DIAGNOSIS — S82.892A CLOSED FRACTURE OF LEFT ANKLE, INITIAL ENCOUNTER: ICD-10-CM

## 2023-11-29 DIAGNOSIS — S82.892A CLOSED FRACTURE OF LEFT ANKLE, INITIAL ENCOUNTER: Primary | ICD-10-CM

## 2023-11-29 RX ORDER — IPRATROPIUM BROMIDE 42 UG/1
1 SPRAY, METERED NASAL
COMMUNITY

## 2023-11-29 RX ORDER — HYDROCODONE BITARTRATE AND ACETAMINOPHEN 5; 325 MG/1; MG/1
TABLET ORAL
COMMUNITY
End: 2023-12-04

## 2023-11-29 RX ORDER — IBUPROFEN 600 MG/1
TABLET ORAL
COMMUNITY
End: 2023-12-04

## 2023-11-29 NOTE — PROGRESS NOTES
[facility] [place] [facility] Office Visit     Office Visit       Date: 11/29/2023   Patient Name: Lluvia Kumar  MRN: [id number]  YOB: 1970    Referring Physician: Referring, Self     Chief Complaint:   Chief Complaint   Patient presents with    Left Ankle - Pain     Prep For SX        History of Present Illness: Lluvia Kumar is a 53 y.o. female who is here today with left ankle pain.  Mechanical fall November 22 with ankle fracture dislocation which was reduced in the emergency department that day.  Continues to have aching pain again shooting pain.  Elevation helps with her symptoms.  No previous surgery.  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  Denies medical comorbidities.    Subjective   Review of Systems: Review of Systems   Constitutional: Negative.  Negative for chills, fatigue and fever.   HENT: Negative.  Negative for congestion and dental problem.    Eyes: Negative.  Negative for blurred vision.   Respiratory: Negative.  Negative for shortness of breath.    Cardiovascular: Negative.  Negative for leg swelling.   Gastrointestinal: Negative.  Negative for abdominal pain.   Endocrine: Negative.  Negative for polyuria.   Genitourinary: Negative.  Negative for difficulty urinating.   Musculoskeletal:  Positive for arthralgias.   Skin: Negative.    Allergic/Immunologic: Negative.    Neurological: Negative.    Hematological: Negative.  Negative for adenopathy.   Psychiatric/Behavioral: Negative.  Negative for behavioral problems.         Past Medical History:   Past Medical History:   Diagnosis Date    Anemia     Anxiety     Breast lump     right    CTS (carpal tunnel syndrome) 2021    Fibrocystic breast     Fibroids     GERD (gastroesophageal reflux disease)     History of transfusion     in college needed transfusion due to anemia    Ovarian cyst     Seasonal allergies        Past Surgical History:   Past Surgical History:   Procedure Laterality Date     BREAST BIOPSY Right     2001    BREAST LUMPECTOMY Right     had 2 removed    CARPAL TUNNEL RELEASE       SECTION  ,     COLONOSCOPY      HAND SURGERY      Trigger finger and carpal tunnel    HYSTERECTOMY      OOPHORECTOMY      TONSILLECTOMY      TOTAL LAPAROSCOPIC HYSTERECTOMY SALPINGECTOMY N/A 2021    Procedure: TOTAL LAPAROSCOPIC HYSTERECTOMY BILATERAL SALPINGO-OOPHERECTOMY WITH DAVINCI ROBOT;  Surgeon: Sushma Acevedo MD;  Location: [facility];  Service: [facility];  Laterality: N/A;    WISDOM TOOTH EXTRACTION         Family History:   Family History   Problem Relation Age of Onset    Diabetes type II Mother     Hypertension Mother     Diabetes Mother     Breast cancer Paternal Grandmother 60    Aneurysm Paternal Grandfather     Breast cancer Paternal Aunt     Breast cancer Paternal Aunt     Breast cancer Paternal Aunt     Cancer Sister         Thyroid cancer 2 times    Cancer Brother         Thyroid cancer    Ovarian cancer Neg Hx        Social History:   Social History     Socioeconomic History    Marital status:    Tobacco Use    Smoking status: Former     Packs/day: 1.00     Years: 30.00     Additional pack years: 0.00     Total pack years: 30.00     Types: Cigarettes     Start date: 1986     Quit date: 1996     Years since quittin.9     Passive exposure: Past    Smokeless tobacco: Never   Vaping Use    Vaping Use: Never used   Substance and Sexual Activity    Alcohol use: Yes     Alcohol/week: 2.0 standard drinks of alcohol     Types: 2 Glasses of wine per week     Comment: weekly social use    Drug use: Never    Sexual activity: Yes     Partners: Male     Birth control/protection: Hysterectomy, Surgical       Medications:   Current Outpatient Medications:     estradiol (Climara) 0.025 MG/24HR patch, Place 1 patch on the skin as directed by provider 1 (One) Time Per Week., Disp: 12 patch, Rfl: 4    HYDROcodone-acetaminophen (NORCO) 5-325 MG per  "tablet, TAKE 1 TABLET BY MOUTH EVERY 6 HOURS AS NEEDED FOR MODERATE PAIN FOR UP TO 3 DAYS, Disp: , Rfl:     ibuprofen (ADVIL,MOTRIN) 600 MG tablet, , Disp: , Rfl:     ipratropium (ATROVENT) 0.06 % nasal spray, USE 2 SPRAY(S) IN EACH NOSTRIL THREE TIMES DAILY, Disp: , Rfl:     ondansetron ODT (ZOFRAN-ODT) 4 MG disintegrating tablet, Place 1 tablet on the tongue Every 6 (Six) Hours As Needed for Nausea or Vomiting. As needed for nausea, Disp: 12 tablet, Rfl: 0    oxyCODONE-acetaminophen (PERCOCET) 7.5-325 MG per tablet, Take 1 tablet by mouth Every 6 (Six) Hours As Needed for Severe Pain., Disp: 12 tablet, Rfl: 0    levocetirizine (Xyzal) 5 MG tablet, Take 1 tablet by mouth Every Evening for 30 days., Disp: 30 tablet, Rfl: 6    Allergies: No Known Allergies    I reviewed the patient's chief complaint, history of present illness, review of systems, past medical history, surgical history, family history, social history, medications and allergy list.     Objective    Vital Signs:   Vitals:    11/29/23 1229   BP: 142/84   BP Location: Left arm   Patient Position: Sitting   Cuff Size: Adult   Weight: 90.4 kg (199 lb 3.2 oz)   Height: 165.1 cm (65\")     Body mass index is 33.15 kg/m².    Ortho Exam:  left LE Foot and Ankle Exam:   Neurological exam of the superficial peroneal, deep peroneal, plantar, sural and saphenous nerves demonstrates intact sensation.   10/10 sites felt on monofilament testing of foot.   Able to flex and extend toes, can dorsiflex and plantarflex ankle however motion limited 2/2 to pain.  Peripheral pulses including posterior tibial artery and deep peroneal artery are intact and palpable. There is good perfusion to the toes.   The skin is intact throughout the foot and ankle without ulceration.   There is tenderness to palpation about the ankle, worst laterally. No tenderness to palpation over the proximal fibula, visible swelling about the ankle with ecchymosis lateral hindfoot.      Results Review: "   XR ANKLE 3+ VW LEFT     Date of Exam: 11/22/2023 11:42 PM EST     Indication: post reduction     Comparison: 11/22/2023 and 2147 hours     Findings:  Interval reduction of trimalleolar fracture dislocation. Alignment appears near anatomic with mild displacement of the trimalleolar fracture components. The ankle mortise is symmetric and the talar dome appears intact. There is generalized soft tissue   swelling at the ankle and there has been interval casting. No new abnormalities seen.     IMPRESSION:  Impression:  Interval reduction of trimalleolar fracture dislocation with near-anatomic alignment.           Electronically Signed: Rory Isaac MD    11/23/2023 12:13 AM EST    Workstation ID: [id number]      Assessment / Plan    Assessment/Plan:   Diagnoses and all orders for this visit:    1. Closed fracture of left ankle, initial encounter (Primary)  -     CT ankle left wo contrast; Future      Discussed fracture with patient as well as treatment options at length. Also reviewed external note and imaging studies from November 23. Patient has a fracture (an injury with risk of long term functional impairment). I explained fractures of joints to the patient.  I explained that all joint fractures will develop some arthritis. The goal of treatment is to put the joint back in  place as anatomically as possible, and hold it there to heal. This helps to slow down the progression of post-traumatic arthritis.  We cannot eliminate it.  I explained that fractures have stable and unstable patterns.  I think this fracture is too unstable to hold in a cast, I think the risk of malunion and non-union are too high if we use cast treatment. I am recommending surgical fixation.  I explained the surgery and plan for fixation. I explained that most hardware is designed to stay in permanently however rarely becomes bothersome and can be removed.  I explained the possibility of syndesmotic fixation and the possible need to remove this  fixation (I will not know if this is necessary until we are in surgery).  I explained how all ankle fractures swell for months to years, some permanently. Questions were asked and answered in detail.    Plan for surgery will be left ankle ORIF, possible syndesmotic fixation, all other indicated procedures..     The risks and benefits of the procedure were discussed with the patient and or appropriate guardian, which include but are not limited to the risk of bleeding, infection, neurovascular damage, post-operative stiffness, tendon and/or ligament retears, recurrent instability, continued pain, arthritic pain, need for further revision surgeries in the future, deep venous thrombosis, and general risks from anesthesia. We also discussed the post-operative rehabilitation, the need for physical therapy, and the overall expected outcomes from the procedure. We allowed proper time and answered the patient's questions regarding the procedure. Knowing what the risks are and what the conservative treatment is, the patient elected to forgo any further conservative treatment options and proceed with the surgical intervention.        Follow Up:   Return for F/U after Surgery.      Edgar De La Paz MD  [facility] [place] [facility]

## 2023-11-29 NOTE — H&P (VIEW-ONLY)
[facility] [place] [facility] Office Visit     Office Visit       Date: 11/29/2023   Patient Name: Lluvia Kumar  MRN: [id number]  YOB: 1970    Referring Physician: Referring, Self     Chief Complaint:   Chief Complaint   Patient presents with    Left Ankle - Pain     Prep For SX        History of Present Illness: Lluvia Kumar is a 53 y.o. female who is here today with left ankle pain.  Mechanical fall November 22 with ankle fracture dislocation which was reduced in the emergency department that day.  Continues to have aching pain again shooting pain.  Elevation helps with her symptoms.  No previous surgery.  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  Denies medical comorbidities.    Subjective   Review of Systems: Review of Systems   Constitutional: Negative.  Negative for chills, fatigue and fever.   HENT: Negative.  Negative for congestion and dental problem.    Eyes: Negative.  Negative for blurred vision.   Respiratory: Negative.  Negative for shortness of breath.    Cardiovascular: Negative.  Negative for leg swelling.   Gastrointestinal: Negative.  Negative for abdominal pain.   Endocrine: Negative.  Negative for polyuria.   Genitourinary: Negative.  Negative for difficulty urinating.   Musculoskeletal:  Positive for arthralgias.   Skin: Negative.    Allergic/Immunologic: Negative.    Neurological: Negative.    Hematological: Negative.  Negative for adenopathy.   Psychiatric/Behavioral: Negative.  Negative for behavioral problems.         Past Medical History:   Past Medical History:   Diagnosis Date    Anemia     Anxiety     Breast lump     right    CTS (carpal tunnel syndrome) 2021    Fibrocystic breast     Fibroids     GERD (gastroesophageal reflux disease)     History of transfusion     in college needed transfusion due to anemia    Ovarian cyst     Seasonal allergies        Past Surgical History:   Past Surgical History:   Procedure Laterality Date     BREAST BIOPSY Right     2001    BREAST LUMPECTOMY Right     had 2 removed    CARPAL TUNNEL RELEASE       SECTION  ,     COLONOSCOPY      HAND SURGERY      Trigger finger and carpal tunnel    HYSTERECTOMY      OOPHORECTOMY      TONSILLECTOMY      TOTAL LAPAROSCOPIC HYSTERECTOMY SALPINGECTOMY N/A 2021    Procedure: TOTAL LAPAROSCOPIC HYSTERECTOMY BILATERAL SALPINGO-OOPHERECTOMY WITH DAVINCI ROBOT;  Surgeon: Sushma Acevedo MD;  Location: [facility];  Service: [facility];  Laterality: N/A;    WISDOM TOOTH EXTRACTION         Family History:   Family History   Problem Relation Age of Onset    Diabetes type II Mother     Hypertension Mother     Diabetes Mother     Breast cancer Paternal Grandmother 60    Aneurysm Paternal Grandfather     Breast cancer Paternal Aunt     Breast cancer Paternal Aunt     Breast cancer Paternal Aunt     Cancer Sister         Thyroid cancer 2 times    Cancer Brother         Thyroid cancer    Ovarian cancer Neg Hx        Social History:   Social History     Socioeconomic History    Marital status:    Tobacco Use    Smoking status: Former     Packs/day: 1.00     Years: 30.00     Additional pack years: 0.00     Total pack years: 30.00     Types: Cigarettes     Start date: 1986     Quit date: 1996     Years since quittin.9     Passive exposure: Past    Smokeless tobacco: Never   Vaping Use    Vaping Use: Never used   Substance and Sexual Activity    Alcohol use: Yes     Alcohol/week: 2.0 standard drinks of alcohol     Types: 2 Glasses of wine per week     Comment: weekly social use    Drug use: Never    Sexual activity: Yes     Partners: Male     Birth control/protection: Hysterectomy, Surgical       Medications:   Current Outpatient Medications:     estradiol (Climara) 0.025 MG/24HR patch, Place 1 patch on the skin as directed by provider 1 (One) Time Per Week., Disp: 12 patch, Rfl: 4    HYDROcodone-acetaminophen (NORCO) 5-325 MG per  "tablet, TAKE 1 TABLET BY MOUTH EVERY 6 HOURS AS NEEDED FOR MODERATE PAIN FOR UP TO 3 DAYS, Disp: , Rfl:     ibuprofen (ADVIL,MOTRIN) 600 MG tablet, , Disp: , Rfl:     ipratropium (ATROVENT) 0.06 % nasal spray, USE 2 SPRAY(S) IN EACH NOSTRIL THREE TIMES DAILY, Disp: , Rfl:     ondansetron ODT (ZOFRAN-ODT) 4 MG disintegrating tablet, Place 1 tablet on the tongue Every 6 (Six) Hours As Needed for Nausea or Vomiting. As needed for nausea, Disp: 12 tablet, Rfl: 0    oxyCODONE-acetaminophen (PERCOCET) 7.5-325 MG per tablet, Take 1 tablet by mouth Every 6 (Six) Hours As Needed for Severe Pain., Disp: 12 tablet, Rfl: 0    levocetirizine (Xyzal) 5 MG tablet, Take 1 tablet by mouth Every Evening for 30 days., Disp: 30 tablet, Rfl: 6    Allergies: No Known Allergies    I reviewed the patient's chief complaint, history of present illness, review of systems, past medical history, surgical history, family history, social history, medications and allergy list.     Objective    Vital Signs:   Vitals:    11/29/23 1229   BP: 142/84   BP Location: Left arm   Patient Position: Sitting   Cuff Size: Adult   Weight: 90.4 kg (199 lb 3.2 oz)   Height: 165.1 cm (65\")     Body mass index is 33.15 kg/m².    Ortho Exam:  left LE Foot and Ankle Exam:   Neurological exam of the superficial peroneal, deep peroneal, plantar, sural and saphenous nerves demonstrates intact sensation.   10/10 sites felt on monofilament testing of foot.   Able to flex and extend toes, can dorsiflex and plantarflex ankle however motion limited 2/2 to pain.  Peripheral pulses including posterior tibial artery and deep peroneal artery are intact and palpable. There is good perfusion to the toes.   The skin is intact throughout the foot and ankle without ulceration.   There is tenderness to palpation about the ankle, worst laterally. No tenderness to palpation over the proximal fibula, visible swelling about the ankle with ecchymosis lateral hindfoot.      Results Review: "   XR ANKLE 3+ VW LEFT     Date of Exam: 11/22/2023 11:42 PM EST     Indication: post reduction     Comparison: 11/22/2023 and 2147 hours     Findings:  Interval reduction of trimalleolar fracture dislocation. Alignment appears near anatomic with mild displacement of the trimalleolar fracture components. The ankle mortise is symmetric and the talar dome appears intact. There is generalized soft tissue   swelling at the ankle and there has been interval casting. No new abnormalities seen.     IMPRESSION:  Impression:  Interval reduction of trimalleolar fracture dislocation with near-anatomic alignment.           Electronically Signed: Rory Isaac MD    11/23/2023 12:13 AM EST    Workstation ID: [id number]      Assessment / Plan    Assessment/Plan:   Diagnoses and all orders for this visit:    1. Closed fracture of left ankle, initial encounter (Primary)  -     CT ankle left wo contrast; Future      Discussed fracture with patient as well as treatment options at length. Also reviewed external note and imaging studies from November 23. Patient has a fracture (an injury with risk of long term functional impairment). I explained fractures of joints to the patient.  I explained that all joint fractures will develop some arthritis. The goal of treatment is to put the joint back in  place as anatomically as possible, and hold it there to heal. This helps to slow down the progression of post-traumatic arthritis.  We cannot eliminate it.  I explained that fractures have stable and unstable patterns.  I think this fracture is too unstable to hold in a cast, I think the risk of malunion and non-union are too high if we use cast treatment. I am recommending surgical fixation.  I explained the surgery and plan for fixation. I explained that most hardware is designed to stay in permanently however rarely becomes bothersome and can be removed.  I explained the possibility of syndesmotic fixation and the possible need to remove this  fixation (I will not know if this is necessary until we are in surgery).  I explained how all ankle fractures swell for months to years, some permanently. Questions were asked and answered in detail.    Plan for surgery will be left ankle ORIF, possible syndesmotic fixation, all other indicated procedures..     The risks and benefits of the procedure were discussed with the patient and or appropriate guardian, which include but are not limited to the risk of bleeding, infection, neurovascular damage, post-operative stiffness, tendon and/or ligament retears, recurrent instability, continued pain, arthritic pain, need for further revision surgeries in the future, deep venous thrombosis, and general risks from anesthesia. We also discussed the post-operative rehabilitation, the need for physical therapy, and the overall expected outcomes from the procedure. We allowed proper time and answered the patient's questions regarding the procedure. Knowing what the risks are and what the conservative treatment is, the patient elected to forgo any further conservative treatment options and proceed with the surgical intervention.        Follow Up:   Return for F/U after Surgery.      Edgar De La Paz MD  [facility]

## 2023-11-29 NOTE — PATIENT INSTRUCTIONS
"Knee Scooters:      [employer] [employer] [facility] [employer]  -[address], [address], [place] [address]  -[address]section of [address]  -Phone: [phone]  ~$75.00/month rental    [employer] - type in \"knee scooter\"  -Can purchase online for $100-150           "

## 2023-12-01 ENCOUNTER — HOSPITAL ENCOUNTER (OUTPATIENT)
Dept: CT IMAGING | Facility: HOSPITAL | Age: 53
Discharge: HOME | End: 2023-12-01
Admitting: ORTHOPAEDIC SURGERY
Payer: COMMERCIAL

## 2023-12-01 DIAGNOSIS — S82.892A CLOSED FRACTURE OF LEFT ANKLE, INITIAL ENCOUNTER: ICD-10-CM

## 2023-12-01 PROCEDURE — 73700 CT LOWER EXTREMITY W/O DYE: CPT

## 2023-12-04 ENCOUNTER — PRE-ADMISSION TESTING (OUTPATIENT)
Dept: PREADMISSION TESTING | Facility: HOSPITAL | Age: 53
End: 2023-12-04
Payer: COMMERCIAL

## 2023-12-04 ENCOUNTER — ANESTHESIA EVENT (OUTPATIENT)
Dept: PERIOP | Facility: HOSPITAL | Age: 53
End: 2023-12-04
Payer: COMMERCIAL

## 2023-12-04 DIAGNOSIS — S82.892A CLOSED FRACTURE OF LEFT ANKLE, INITIAL ENCOUNTER: ICD-10-CM

## 2023-12-04 LAB
ANION GAP SERPL CALCULATED.3IONS-SCNC: 10 MMOL/L (ref 5–15)
BASOPHILS # BLD AUTO: 0.02 10*3/MM3 (ref 0–0.2)
BASOPHILS NFR BLD AUTO: 0.3 % (ref 0–1.5)
BUN SERPL-MCNC: 15 MG/DL (ref 6–20)
BUN/CREAT SERPL: 18.1 (ref 7–25)
CALCIUM SPEC-SCNC: 9.1 MG/DL (ref 8.6–10.5)
CHLORIDE SERPL-SCNC: 104 MMOL/L (ref 98–107)
CO2 SERPL-SCNC: 26 MMOL/L (ref 22–29)
CREAT SERPL-MCNC: 0.83 MG/DL (ref 0.57–1)
DEPRECATED RDW RBC AUTO: 40.8 FL (ref 37–54)
EGFRCR SERPLBLD CKD-EPI 2021: 83.4 ML/MIN/1.73
EOSINOPHIL # BLD AUTO: 0.15 10*3/MM3 (ref 0–0.4)
EOSINOPHIL NFR BLD AUTO: 1.9 % (ref 0.3–6.2)
ERYTHROCYTE [DISTWIDTH] IN BLOOD BY AUTOMATED COUNT: 12.1 % (ref 12.3–15.4)
GLUCOSE SERPL-MCNC: 100 MG/DL (ref 65–99)
HBA1C MFR BLD: 4.7 % (ref 4.8–5.6)
HCT VFR BLD AUTO: 40.1 % (ref 34–46.6)
HGB BLD-MCNC: 13.6 G/DL (ref 12–15.9)
IMM GRANULOCYTES # BLD AUTO: 0.02 10*3/MM3 (ref 0–0.05)
IMM GRANULOCYTES NFR BLD AUTO: 0.3 % (ref 0–0.5)
LYMPHOCYTES # BLD AUTO: 1.47 10*3/MM3 (ref 0.7–3.1)
LYMPHOCYTES NFR BLD AUTO: 18.5 % (ref 19.6–45.3)
MCH RBC QN AUTO: 31.3 PG (ref 26.6–33)
MCHC RBC AUTO-ENTMCNC: 33.9 G/DL (ref 31.5–35.7)
MCV RBC AUTO: 92.2 FL (ref 79–97)
MONOCYTES # BLD AUTO: 0.58 10*3/MM3 (ref 0.1–0.9)
MONOCYTES NFR BLD AUTO: 7.3 % (ref 5–12)
NEUTROPHILS NFR BLD AUTO: 5.69 10*3/MM3 (ref 1.7–7)
NEUTROPHILS NFR BLD AUTO: 71.7 % (ref 42.7–76)
NRBC BLD AUTO-RTO: 0 /100 WBC (ref 0–0.2)
PLATELET # BLD AUTO: 238 10*3/MM3 (ref 140–450)
PMV BLD AUTO: 10.3 FL (ref 6–12)
POTASSIUM SERPL-SCNC: 3.8 MMOL/L (ref 3.5–5.2)
QT INTERVAL: 368 MS
QTC INTERVAL: 410 MS
RBC # BLD AUTO: 4.35 10*6/MM3 (ref 3.77–5.28)
SODIUM SERPL-SCNC: 140 MMOL/L (ref 136–145)
WBC NRBC COR # BLD AUTO: 7.93 10*3/MM3 (ref 3.4–10.8)

## 2023-12-04 PROCEDURE — 93005 ELECTROCARDIOGRAM TRACING: CPT

## 2023-12-04 PROCEDURE — 93010 ELECTROCARDIOGRAM REPORT: CPT | Performed by: INTERNAL MEDICINE

## 2023-12-04 PROCEDURE — 83036 HEMOGLOBIN GLYCOSYLATED A1C: CPT

## 2023-12-04 PROCEDURE — 85025 COMPLETE CBC W/AUTO DIFF WBC: CPT

## 2023-12-04 PROCEDURE — 80048 BASIC METABOLIC PNL TOTAL CA: CPT

## 2023-12-04 PROCEDURE — 36415 COLL VENOUS BLD VENIPUNCTURE: CPT

## 2023-12-04 RX ORDER — ACETAMINOPHEN 500 MG
1000 TABLET ORAL EVERY 6 HOURS PRN
COMMUNITY
End: 2023-12-06 | Stop reason: HOSPADM

## 2023-12-04 RX ORDER — ESTRADIOL 0.05 MG/D
1 PATCH, EXTENDED RELEASE TRANSDERMAL WEEKLY
COMMUNITY

## 2023-12-04 RX ORDER — SODIUM CHLORIDE 0.9 % (FLUSH) 0.9 %
10 SYRINGE (ML) INJECTION EVERY 12 HOURS SCHEDULED
Status: CANCELLED | OUTPATIENT
Start: 2023-12-04

## 2023-12-04 RX ORDER — FAMOTIDINE 10 MG/ML
20 INJECTION, SOLUTION INTRAVENOUS ONCE
Status: CANCELLED | OUTPATIENT
Start: 2023-12-04 | End: 2023-12-04

## 2023-12-04 RX ORDER — SODIUM CHLORIDE 0.9 % (FLUSH) 0.9 %
10 SYRINGE (ML) INJECTION AS NEEDED
Status: CANCELLED | OUTPATIENT
Start: 2023-12-04

## 2023-12-04 RX ORDER — SODIUM CHLORIDE 9 MG/ML
40 INJECTION, SOLUTION INTRAVENOUS AS NEEDED
Status: CANCELLED | OUTPATIENT
Start: 2023-12-04

## 2023-12-04 NOTE — PAT
An arrival time for procedure was not provided during PAT visit. If patient had any questions or concerns about their arrival time, they were instructed to contact their surgeon/physician.  Additionally, if the patient referred to an arrival time that was acquired from their my chart account, patient was encouraged to verify that time with their surgeon/physician. Arrival times are NOT provided in Pre Admission Testing Department.     Patient denies any current skin issues.      Patient to apply Chlorhexadine wipes  to surgical area (as instructed) the night before procedure and the AM of procedure. Wipes provided.     Patient instructed to drink 20 ounces of Gatorade or Gatorlyte (if diabetic) and it needs to be completed 1 hour (for Main OR patients) or 2 hours (scheduled  section & [facility] patients) before given arrival time for procedure (NO RED Gatorade and NO Gatorade Zero).    Patient verbalized understanding.

## 2023-12-04 NOTE — DISCHARGE INSTRUCTIONS
Patient to apply Chlorhexadine wipes  to surgical area (as instructed) the night before procedure and the AM of procedure. Wipes provided.     Patient instructed to drink 20 ounces of Gatorade or Gatorlyte (if diabetic) and it needs to be completed 1 hour (for Main OR patients) or 2 hours (sched[facility]  [facility] & [facility]) before given arrival time for procedure (NO RED Gatorade and NO Gatorade Zero).    Patient verbalized understanding.     Patient viewed general PAT education video as instructed in their preoperative information received from their surgeon.  Patient stated the general PAT education video was viewed in its entirety and survey completed.  Copies of PAT general education handouts (Incentive Spirometry, Meds to Beds Program, Patient Belongings, Pre-op skin preparation instructions, Blood Glucose testing, Visitor policy, Surgery FAQ, Code H) distributed to patient if not printed. Education related to the PAT pass and skin preparation for surgery (if applicable) completed in PAT as a reinforcement to PAT education video. Patient instructed to return PAT pass provided today as well as completed skin preparation sheet (if applicable) on the day of procedure.     Additionally if patient had not viewed video yet but intended to view it at home or in our waiting area, then referred them to the handout with QR code/link provided during PAT visit.  Instructed patient to complete survey after viewing the video in its entirety.  Encouraged patient/family to read PAT general education handouts thoroughly and notify PAT staff with any questions or concerns. Patient verbalized understanding of all information and priority content.

## 2023-12-05 ENCOUNTER — HOSPITAL ENCOUNTER (OUTPATIENT)
Facility: HOSPITAL | Age: 53
Discharge: HOME | End: 2023-12-06
Attending: ORTHOPAEDIC SURGERY | Admitting: ORTHOPAEDIC SURGERY
Payer: COMMERCIAL

## 2023-12-05 ENCOUNTER — ANESTHESIA EVENT CONVERTED (OUTPATIENT)
Dept: ANESTHESIOLOGY | Facility: HOSPITAL | Age: 53
End: 2023-12-05
Payer: COMMERCIAL

## 2023-12-05 ENCOUNTER — APPOINTMENT (OUTPATIENT)
Dept: GENERAL RADIOLOGY | Facility: HOSPITAL | Age: 53
End: 2023-12-05
Payer: COMMERCIAL

## 2023-12-05 ENCOUNTER — ANESTHESIA (OUTPATIENT)
Dept: PERIOP | Facility: HOSPITAL | Age: 53
End: 2023-12-05
Payer: COMMERCIAL

## 2023-12-05 DIAGNOSIS — S82.892A CLOSED FRACTURE OF LEFT ANKLE, INITIAL ENCOUNTER: ICD-10-CM

## 2023-12-05 DIAGNOSIS — Z98.890 S/P ORIF (OPEN REDUCTION INTERNAL FIXATION) FRACTURE: Primary | ICD-10-CM

## 2023-12-05 DIAGNOSIS — Z87.81 S/P ORIF (OPEN REDUCTION INTERNAL FIXATION) FRACTURE: Primary | ICD-10-CM

## 2023-12-05 PROBLEM — S82.899A ANKLE FRACTURE: Status: ACTIVE | Noted: 2023-12-05

## 2023-12-05 PROCEDURE — C1769 GUIDE WIRE: HCPCS | Performed by: ORTHOPAEDIC SURGERY

## 2023-12-05 PROCEDURE — 25010000002 DEXAMETHASONE SODIUM PHOSPHATE 10 MG/ML SOLUTION: Performed by: NURSE ANESTHETIST, CERTIFIED REGISTERED

## 2023-12-05 PROCEDURE — C1713 ANCHOR/SCREW BN/BN,TIS/BN: HCPCS | Performed by: ORTHOPAEDIC SURGERY

## 2023-12-05 PROCEDURE — 25810000003 LACTATED RINGERS PER 1000 ML: Performed by: ANESTHESIOLOGY

## 2023-12-05 PROCEDURE — 25810000003 SODIUM CHLORIDE 0.9 % SOLUTION: Performed by: ORTHOPAEDIC SURGERY

## 2023-12-05 PROCEDURE — 25010000002 BUPIVACAINE (PF) 0.25 % SOLUTION: Performed by: NURSE ANESTHETIST, CERTIFIED REGISTERED

## 2023-12-05 PROCEDURE — 25010000002 DEXAMETHASONE PER 1 MG: Performed by: NURSE ANESTHETIST, CERTIFIED REGISTERED

## 2023-12-05 PROCEDURE — 25010000002 FENTANYL CITRATE (PF) 50 MCG/ML SOLUTION

## 2023-12-05 PROCEDURE — 25010000002 ONDANSETRON PER 1 MG: Performed by: ORTHOPAEDIC SURGERY

## 2023-12-05 PROCEDURE — 25010000002 FENTANYL CITRATE (PF) 50 MCG/ML SOLUTION: Performed by: NURSE ANESTHETIST, CERTIFIED REGISTERED

## 2023-12-05 PROCEDURE — 76000 FLUOROSCOPY <1 HR PHYS/QHP: CPT | Performed by: ORTHOPAEDIC SURGERY

## 2023-12-05 PROCEDURE — 25010000002 CEFAZOLIN PER 500 MG: Performed by: ORTHOPAEDIC SURGERY

## 2023-12-05 PROCEDURE — 25010000002 ROPIVACAINE PER 1 MG: Performed by: NURSE ANESTHETIST, CERTIFIED REGISTERED

## 2023-12-05 PROCEDURE — 25010000002 ONDANSETRON PER 1 MG

## 2023-12-05 PROCEDURE — 27822 TREATMENT OF ANKLE FRACTURE: CPT | Performed by: ORTHOPAEDIC SURGERY

## 2023-12-05 PROCEDURE — 76000 FLUOROSCOPY <1 HR PHYS/QHP: CPT

## 2023-12-05 PROCEDURE — 27829 TREAT LOWER LEG JOINT: CPT | Performed by: ORTHOPAEDIC SURGERY

## 2023-12-05 PROCEDURE — 25010000002 HYDROMORPHONE 1 MG/ML SOLUTION

## 2023-12-05 PROCEDURE — G0378 HOSPITAL OBSERVATION PER HR: HCPCS

## 2023-12-05 PROCEDURE — 25010000002 PROPOFOL 10 MG/ML EMULSION: Performed by: NURSE ANESTHETIST, CERTIFIED REGISTERED

## 2023-12-05 DEVICE — SCRW COMPR KREULOCK LK FUL/THRD SS 2.7X12MM: Type: IMPLANTABLE DEVICE | Site: ANKLE | Status: FUNCTIONAL

## 2023-12-05 DEVICE — SCRW COMPR KREULOCK LK FUL/THRD SS 2.7X16MM: Type: IMPLANTABLE DEVICE | Site: ANKLE | Status: FUNCTIONAL

## 2023-12-05 DEVICE — IMPLANTABLE DEVICE: Type: IMPLANTABLE DEVICE | Site: ANKLE | Status: FUNCTIONAL

## 2023-12-05 DEVICE — K-LESS T-ROPE W/DRV, SYN REPR, SS
Type: IMPLANTABLE DEVICE | Site: ANKLE | Status: FUNCTIONAL
Brand: ARTHREX®

## 2023-12-05 DEVICE — SCRW CORT LP SS 3.5X14MM: Type: IMPLANTABLE DEVICE | Site: ANKLE | Status: FUNCTIONAL

## 2023-12-05 DEVICE — SCRW COMPR KREULOCK LK FUL/THRD SS 2.7X14MM: Type: IMPLANTABLE DEVICE | Site: ANKLE | Status: FUNCTIONAL

## 2023-12-05 RX ORDER — SODIUM CHLORIDE 0.9 % (FLUSH) 0.9 %
10 SYRINGE (ML) INJECTION EVERY 12 HOURS SCHEDULED
Status: DISCONTINUED | OUTPATIENT
Start: 2023-12-05 | End: 2023-12-06 | Stop reason: HOSPADM

## 2023-12-05 RX ORDER — OXYCODONE HYDROCHLORIDE 5 MG/1
5 TABLET ORAL EVERY 4 HOURS PRN
Qty: 40 TABLET | Refills: 0 | Status: CANCELLED | OUTPATIENT
Start: 2023-12-05

## 2023-12-05 RX ORDER — SODIUM CHLORIDE 9 MG/ML
100 INJECTION, SOLUTION INTRAVENOUS CONTINUOUS
Status: DISCONTINUED | OUTPATIENT
Start: 2023-12-05 | End: 2023-12-06 | Stop reason: HOSPADM

## 2023-12-05 RX ORDER — SODIUM CHLORIDE 0.9 % (FLUSH) 0.9 %
10 SYRINGE (ML) INJECTION AS NEEDED
Status: DISCONTINUED | OUTPATIENT
Start: 2023-12-05 | End: 2023-12-06 | Stop reason: HOSPADM

## 2023-12-05 RX ORDER — HYDROMORPHONE HYDROCHLORIDE 1 MG/ML
0.5 INJECTION, SOLUTION INTRAMUSCULAR; INTRAVENOUS; SUBCUTANEOUS
Status: DISCONTINUED | OUTPATIENT
Start: 2023-12-05 | End: 2023-12-05 | Stop reason: HOSPADM

## 2023-12-05 RX ORDER — AMOXICILLIN 250 MG
2 CAPSULE ORAL 2 TIMES DAILY PRN
Status: DISCONTINUED | OUTPATIENT
Start: 2023-12-05 | End: 2023-12-06 | Stop reason: HOSPADM

## 2023-12-05 RX ORDER — LABETALOL HYDROCHLORIDE 5 MG/ML
10 INJECTION, SOLUTION INTRAVENOUS EVERY 4 HOURS PRN
Status: DISCONTINUED | OUTPATIENT
Start: 2023-12-05 | End: 2023-12-06 | Stop reason: HOSPADM

## 2023-12-05 RX ORDER — FENTANYL CITRATE 50 UG/ML
50 INJECTION, SOLUTION INTRAMUSCULAR; INTRAVENOUS
Status: COMPLETED | OUTPATIENT
Start: 2023-12-05 | End: 2023-12-05

## 2023-12-05 RX ORDER — DEXAMETHASONE SODIUM PHOSPHATE 10 MG/ML
INJECTION, SOLUTION INTRAMUSCULAR; INTRAVENOUS
Status: COMPLETED | OUTPATIENT
Start: 2023-12-05 | End: 2023-12-05

## 2023-12-05 RX ORDER — SODIUM CHLORIDE, SODIUM LACTATE, POTASSIUM CHLORIDE, CALCIUM CHLORIDE 600; 310; 30; 20 MG/100ML; MG/100ML; MG/100ML; MG/100ML
9 INJECTION, SOLUTION INTRAVENOUS CONTINUOUS
Status: DISCONTINUED | OUTPATIENT
Start: 2023-12-05 | End: 2023-12-06 | Stop reason: HOSPADM

## 2023-12-05 RX ORDER — BISACODYL 10 MG
10 SUPPOSITORY, RECTAL RECTAL DAILY PRN
Status: DISCONTINUED | OUTPATIENT
Start: 2023-12-05 | End: 2023-12-06 | Stop reason: HOSPADM

## 2023-12-05 RX ORDER — NALOXONE HCL 0.4 MG/ML
0.1 VIAL (ML) INJECTION
Status: DISCONTINUED | OUTPATIENT
Start: 2023-12-05 | End: 2023-12-06 | Stop reason: HOSPADM

## 2023-12-05 RX ORDER — ACETAMINOPHEN 325 MG/1
650 TABLET ORAL EVERY 4 HOURS PRN
Status: DISCONTINUED | OUTPATIENT
Start: 2023-12-05 | End: 2023-12-06

## 2023-12-05 RX ORDER — ONDANSETRON 2 MG/ML
4 INJECTION INTRAMUSCULAR; INTRAVENOUS ONCE AS NEEDED
Status: DISCONTINUED | OUTPATIENT
Start: 2023-12-05 | End: 2023-12-05

## 2023-12-05 RX ORDER — SODIUM CHLORIDE 9 MG/ML
40 INJECTION, SOLUTION INTRAVENOUS AS NEEDED
Status: DISCONTINUED | OUTPATIENT
Start: 2023-12-05 | End: 2023-12-06 | Stop reason: HOSPADM

## 2023-12-05 RX ORDER — ONDANSETRON 2 MG/ML
INJECTION INTRAMUSCULAR; INTRAVENOUS
Status: COMPLETED
Start: 2023-12-05 | End: 2023-12-05

## 2023-12-05 RX ORDER — OXYCODONE HYDROCHLORIDE 5 MG/1
5 TABLET ORAL EVERY 4 HOURS PRN
Status: DISCONTINUED | OUTPATIENT
Start: 2023-12-05 | End: 2023-12-06 | Stop reason: HOSPADM

## 2023-12-05 RX ORDER — ROPIVACAINE HYDROCHLORIDE 2 MG/ML
INJECTION, SOLUTION EPIDURAL; INFILTRATION; PERINEURAL CONTINUOUS
Status: DISCONTINUED | OUTPATIENT
Start: 2023-12-05 | End: 2023-12-06 | Stop reason: HOSPADM

## 2023-12-05 RX ORDER — BUPIVACAINE HYDROCHLORIDE 2.5 MG/ML
INJECTION, SOLUTION EPIDURAL; INFILTRATION; INTRACAUDAL
Status: COMPLETED | OUTPATIENT
Start: 2023-12-05 | End: 2023-12-05

## 2023-12-05 RX ORDER — ONDANSETRON 2 MG/ML
INJECTION INTRAMUSCULAR; INTRAVENOUS AS NEEDED
Status: DISCONTINUED | OUTPATIENT
Start: 2023-12-05 | End: 2023-12-05 | Stop reason: SURG

## 2023-12-05 RX ORDER — ESTRADIOL 0.05 MG/D
1 PATCH, EXTENDED RELEASE TRANSDERMAL WEEKLY
Status: DISCONTINUED | OUTPATIENT
Start: 2023-12-05 | End: 2023-12-06 | Stop reason: HOSPADM

## 2023-12-05 RX ORDER — CHOLECALCIFEROL (VITAMIN D3) 125 MCG
5 CAPSULE ORAL NIGHTLY PRN
Status: DISCONTINUED | OUTPATIENT
Start: 2023-12-05 | End: 2023-12-06 | Stop reason: HOSPADM

## 2023-12-05 RX ORDER — MIDAZOLAM HYDROCHLORIDE 1 MG/ML
1 INJECTION INTRAMUSCULAR; INTRAVENOUS
Status: DISCONTINUED | OUTPATIENT
Start: 2023-12-05 | End: 2023-12-05 | Stop reason: HOSPADM

## 2023-12-05 RX ORDER — ONDANSETRON 4 MG/1
4 TABLET, FILM COATED ORAL EVERY 6 HOURS PRN
Status: DISCONTINUED | OUTPATIENT
Start: 2023-12-05 | End: 2023-12-06 | Stop reason: HOSPADM

## 2023-12-05 RX ORDER — ASPIRIN 81 MG/1
81 TABLET ORAL DAILY
Status: DISCONTINUED | OUTPATIENT
Start: 2023-12-05 | End: 2023-12-06 | Stop reason: HOSPADM

## 2023-12-05 RX ORDER — FENTANYL CITRATE 50 UG/ML
INJECTION, SOLUTION INTRAMUSCULAR; INTRAVENOUS
Status: COMPLETED
Start: 2023-12-05 | End: 2023-12-05

## 2023-12-05 RX ORDER — POLYETHYLENE GLYCOL 3350 17 G/17G
17 POWDER, FOR SOLUTION ORAL AS NEEDED
Status: DISCONTINUED | OUTPATIENT
Start: 2023-12-05 | End: 2023-12-06 | Stop reason: HOSPADM

## 2023-12-05 RX ORDER — ONDANSETRON 2 MG/ML
4 INJECTION INTRAMUSCULAR; INTRAVENOUS ONCE
Status: COMPLETED | OUTPATIENT
Start: 2023-12-05 | End: 2023-12-05

## 2023-12-05 RX ORDER — DEXAMETHASONE SODIUM PHOSPHATE 4 MG/ML
INJECTION, SOLUTION INTRA-ARTICULAR; INTRALESIONAL; INTRAMUSCULAR; INTRAVENOUS; SOFT TISSUE AS NEEDED
Status: DISCONTINUED | OUTPATIENT
Start: 2023-12-05 | End: 2023-12-05 | Stop reason: SURG

## 2023-12-05 RX ORDER — PROPOFOL 10 MG/ML
VIAL (ML) INTRAVENOUS AS NEEDED
Status: DISCONTINUED | OUTPATIENT
Start: 2023-12-05 | End: 2023-12-05 | Stop reason: SURG

## 2023-12-05 RX ORDER — FENTANYL CITRATE 50 UG/ML
INJECTION, SOLUTION INTRAMUSCULAR; INTRAVENOUS
Status: COMPLETED | OUTPATIENT
Start: 2023-12-05 | End: 2023-12-05

## 2023-12-05 RX ORDER — FAMOTIDINE 20 MG/1
20 TABLET, FILM COATED ORAL ONCE
Status: COMPLETED | OUTPATIENT
Start: 2023-12-05 | End: 2023-12-05

## 2023-12-05 RX ORDER — IPRATROPIUM BROMIDE 42 UG/1
1 SPRAY, METERED NASAL 3 TIMES DAILY
Status: DISCONTINUED | OUTPATIENT
Start: 2023-12-05 | End: 2023-12-06 | Stop reason: HOSPADM

## 2023-12-05 RX ORDER — LIDOCAINE HYDROCHLORIDE 10 MG/ML
0.5 INJECTION, SOLUTION EPIDURAL; INFILTRATION; INTRACAUDAL; PERINEURAL ONCE AS NEEDED
Status: COMPLETED | OUTPATIENT
Start: 2023-12-05 | End: 2023-12-05

## 2023-12-05 RX ORDER — LIDOCAINE HYDROCHLORIDE 10 MG/ML
INJECTION, SOLUTION EPIDURAL; INFILTRATION; INTRACAUDAL; PERINEURAL AS NEEDED
Status: DISCONTINUED | OUTPATIENT
Start: 2023-12-05 | End: 2023-12-05 | Stop reason: SURG

## 2023-12-05 RX ORDER — ONDANSETRON 2 MG/ML
4 INJECTION INTRAMUSCULAR; INTRAVENOUS EVERY 6 HOURS PRN
Status: DISCONTINUED | OUTPATIENT
Start: 2023-12-05 | End: 2023-12-06 | Stop reason: HOSPADM

## 2023-12-05 RX ORDER — MAGNESIUM HYDROXIDE 1200 MG/15ML
LIQUID ORAL AS NEEDED
Status: DISCONTINUED | OUTPATIENT
Start: 2023-12-05 | End: 2023-12-05 | Stop reason: HOSPADM

## 2023-12-05 RX ADMIN — ONDANSETRON 4 MG: 2 INJECTION INTRAMUSCULAR; INTRAVENOUS at 20:53

## 2023-12-05 RX ADMIN — FENTANYL CITRATE 50 MCG: 50 INJECTION, SOLUTION INTRAMUSCULAR; INTRAVENOUS at 14:44

## 2023-12-05 RX ADMIN — LIDOCAINE HYDROCHLORIDE 0.2 ML: 10 INJECTION, SOLUTION EPIDURAL; INFILTRATION; INTRACAUDAL; PERINEURAL at 09:00

## 2023-12-05 RX ADMIN — ONDANSETRON 4 MG: 2 INJECTION INTRAMUSCULAR; INTRAVENOUS at 14:04

## 2023-12-05 RX ADMIN — BUPIVACAINE HYDROCHLORIDE 30 ML: 2.5 INJECTION, SOLUTION EPIDURAL; INFILTRATION; INTRACAUDAL; PERINEURAL at 09:46

## 2023-12-05 RX ADMIN — OXYCODONE HYDROCHLORIDE 5 MG: 5 TABLET ORAL at 20:59

## 2023-12-05 RX ADMIN — SODIUM CHLORIDE 2 G: 900 INJECTION INTRAVENOUS at 20:53

## 2023-12-05 RX ADMIN — HYDROMORPHONE HYDROCHLORIDE 0.5 MG: 1 INJECTION, SOLUTION INTRAMUSCULAR; INTRAVENOUS; SUBCUTANEOUS at 15:02

## 2023-12-05 RX ADMIN — DEXAMETHASONE SODIUM PHOSPHATE 2 MG: 10 INJECTION, SOLUTION INTRAMUSCULAR; INTRAVENOUS at 09:36

## 2023-12-05 RX ADMIN — ONDANSETRON 4 MG: 2 INJECTION INTRAMUSCULAR; INTRAVENOUS at 15:39

## 2023-12-05 RX ADMIN — DEXAMETHASONE SODIUM PHOSPHATE 4 MG: 4 INJECTION, SOLUTION INTRAMUSCULAR; INTRAVENOUS at 11:41

## 2023-12-05 RX ADMIN — FENTANYL CITRATE 100 MCG: 50 INJECTION, SOLUTION INTRAMUSCULAR; INTRAVENOUS at 09:36

## 2023-12-05 RX ADMIN — FENTANYL CITRATE 50 MCG: 50 INJECTION, SOLUTION INTRAMUSCULAR; INTRAVENOUS at 14:31

## 2023-12-05 RX ADMIN — ESTRADIOL 1 PATCH: 0.05 PATCH TRANSDERMAL at 17:58

## 2023-12-05 RX ADMIN — PROPOFOL 200 MG: 10 INJECTION, EMULSION INTRAVENOUS at 11:38

## 2023-12-05 RX ADMIN — SODIUM CHLORIDE 100 ML/HR: 9 INJECTION, SOLUTION INTRAVENOUS at 16:27

## 2023-12-05 RX ADMIN — ROPIVACAINE HYDROCHLORIDE 1000 MG: 2 INJECTION, SOLUTION EPIDURAL; INFILTRATION at 14:32

## 2023-12-05 RX ADMIN — FENTANYL CITRATE 100 MCG: 50 INJECTION, SOLUTION INTRAMUSCULAR; INTRAVENOUS at 13:28

## 2023-12-05 RX ADMIN — ASPIRIN 81 MG: 81 TABLET, COATED ORAL at 17:58

## 2023-12-05 RX ADMIN — FAMOTIDINE 20 MG: 20 TABLET, FILM COATED ORAL at 09:24

## 2023-12-05 RX ADMIN — SODIUM CHLORIDE, POTASSIUM CHLORIDE, SODIUM LACTATE AND CALCIUM CHLORIDE: 600; 310; 30; 20 INJECTION, SOLUTION INTRAVENOUS at 12:50

## 2023-12-05 RX ADMIN — SODIUM CHLORIDE 2 G: 900 INJECTION INTRAVENOUS at 11:41

## 2023-12-05 RX ADMIN — LIDOCAINE HYDROCHLORIDE 50 MG: 10 INJECTION, SOLUTION EPIDURAL; INFILTRATION; INTRACAUDAL; PERINEURAL at 11:38

## 2023-12-05 RX ADMIN — BUPIVACAINE HYDROCHLORIDE 30 ML: 2.5 INJECTION, SOLUTION EPIDURAL; INFILTRATION; INTRACAUDAL at 10:58

## 2023-12-05 RX ADMIN — SODIUM CHLORIDE, POTASSIUM CHLORIDE, SODIUM LACTATE AND CALCIUM CHLORIDE 9 ML/HR: 600; 310; 30; 20 INJECTION, SOLUTION INTRAVENOUS at 09:00

## 2023-12-05 NOTE — PLAN OF CARE
Goal Outcome Evaluation:  Plan of Care Reviewed With: patient        Progress: no change  Outcome Evaluation: Received from the PACU with IVFs infusing without difficulty.Nausea and dry heaving upon arrival. Meds given in the PACU. Nausea much improved at this time.  Left lower extremity in a splint with ace . Pain managed with nerve cath. LLE elevated. Ice intact to LLE.

## 2023-12-05 NOTE — H&P
Patient Name: Lluvia Kumar  MRN: [id number]  : 1970  DOS: 2023    Attending: Edgar De La Paz MD    Primary Care Provider: Kiesha Berger PA      Chief complaint:  Left ankle fracture.     Subjective   Patient is a pleasant 53 y.o. female presented for scheduled surgery by  .     She had a recent fall for which she presented to the emergency department and was diagnosed with a trimalleolar fracture of the left ankle.  She was seen by Dr. De La Paz and opted to proceed with surgery.    Per his note (Lluvia Kumar is a 53 y.o. female with the above condition. Discussed operative and non-operative treatment options and risks including but not limited to pain, infection, bleeding, damage to surrounding structures, and need for additional procedures.  After all questions were fully answered, Lluvia Kumar understood the risks and elected to proceed, and informed consent was obtained. The patient was marked with indelible marking pen after verifying correct side, site, and procedure. )    Today she underwent ORIF of left ankle fracture under general anesthesia and a block, surgery was tolerated well, she has been admitted for further management.    Seen in PACU, doing fairly well, has some postoperative pain for which she has received pain medication by her nurse.    Allergies:  No Known Allergies    Meds:  Medications Prior to Admission   Medication Sig Dispense Refill Last Dose    acetaminophen (TYLENOL) 500 MG tablet Take 2 tablets by mouth Every 6 (Six) Hours As Needed for Mild Pain or Moderate Pain.   2023 at 2315    estradiol (MINIVELLE, VIVELLE-DOT) 0.05 MG/24HR patch Place 1 patch on the skin as directed by provider 1 (One) Time Per Week.   2023    ipratropium (ATROVENT) 0.06 % nasal spray 1 spray.   More than a month    ondansetron ODT (ZOFRAN-ODT) 4 MG disintegrating tablet Place 1 tablet on the tongue Every 6 (Six) Hours As Needed for Nausea or Vomiting. As needed for  nausea 12 tablet 0     oxyCODONE-acetaminophen (PERCOCET) 7.5-325 MG per tablet Take 1 tablet by mouth Every 6 (Six) Hours As Needed for Severe Pain. 12 tablet 0 2023            Past Medical History:   Diagnosis Date    Anemia     Ankle fracture 2023    Anxiety     Fibrocystic breast     Fibroids     GERD (gastroesophageal reflux disease)     History of transfusion     in college needed transfusion due to anemia no reaction    Ovarian cyst     Seasonal allergies     Wears contact lenses     Wears glasses      Past Surgical History:   Procedure Laterality Date    BREAST BIOPSY Right     2001    BREAST LUMPECTOMY Right     had 2 removed    CARPAL TUNNEL RELEASE Bilateral      SECTION  ,     COLONOSCOPY      HAND SURGERY      Trigger finger right thumb and carpal tunnel    TONSILLECTOMY  1982    TOTAL LAPAROSCOPIC HYSTERECTOMY SALPINGECTOMY N/A 2021    Procedure: TOTAL LAPAROSCOPIC HYSTERECTOMY BILATERAL SALPINGO-OOPHERECTOMY WITH DAVINCI ROBOT;  Surgeon: Sushma Acevedo MD;  Location: [facility];  Service: [facility];  Laterality: N/A;    WISDOM TOOTH EXTRACTION       Family History   Problem Relation Age of Onset    Diabetes type II Mother     Hypertension Mother     Diabetes Mother     Breast cancer Paternal Grandmother 60    Aneurysm Paternal Grandfather     Breast cancer Paternal Aunt     Breast cancer Paternal Aunt     Breast cancer Paternal Aunt     Cancer Sister         Thyroid cancer 2 times    Cancer Brother         Thyroid cancer    Ovarian cancer Neg Hx      Social History     Tobacco Use    Smoking status: Former     Packs/day: 1.00     Years: 8.00     Additional pack years: 0.00     Total pack years: 8.00     Types: Cigarettes     Start date: 1986     Quit date: 1996     Years since quittin.9     Passive exposure: Past    Smokeless tobacco: Never   Vaping Use    Vaping Use: Never used   Substance Use Topics    Alcohol use: Yes     Alcohol/week:  "2.0 standard drinks of alcohol     Types: 2 Glasses of wine per week     Comment: weekly social use    Drug use: Never   Retired teacher.     Review of Systems  Pertinent items are noted in HPI    Vital Signs  /87   Pulse 92   Temp 98.1 °F (36.7 °C) (Temporal)   Resp 16   Ht 165.1 cm (65\")   Wt 90.3 kg (199 lb)   LMP  (LMP Unknown)   SpO2 98%   BMI 33.12 kg/m²     Physical Exam:    General Appearance:    Drowsy, cooperative, in no acute distress   Head:    Normocephalic, without obvious abnormality, atraumatic   Eyes:            Lids and lashes normal, conjunctivae and sclerae normal, no   icterus    Ears:    Ears appear intact with no abnormalities noted   Throat:   No oral lesions, no thrush, oral mucosa moist   Neck:   No adenopathy, supple, trachea midline, no thyromegaly         Lungs:     Clear to auscultation,respirations regular, even and                   unlabored    Heart:    Regular rhythm and normal rate, normal S1 and S2, no            murmur, no gallop   Abdomen:     Normal bowel sounds, no masses, no organomegaly, soft        non-tender, non-distended, no guarding, no rebound                 tenderness   Genitalia:    Deferred   Extremities:   CDI splint on left leg/ankle. PNB cath present.    Pulses:   Pulses palpable and equal bilaterally   Skin:   No bleeding, bruising or rash   Neurologic:   Cranial nerves 2 - 12 grossly intact.       I reviewed the patient's new clinical results.       Results from last 7 days   Lab Units 12/04/23  0830   WBC 10*3/mm3 7.93   HEMOGLOBIN g/dL 13.6   HEMATOCRIT % 40.1   PLATELETS 10*3/mm3 238     Results from last 7 days   Lab Units 12/04/23  0831   SODIUM mmol/L 140   POTASSIUM mmol/L 3.8   CHLORIDE mmol/L 104   CO2 mmol/L 26.0   BUN mg/dL 15   CREATININE mg/dL 0.83   CALCIUM mg/dL 9.1   GLUCOSE mg/dL 100*     Lab Results   Component Value Date    HGBA1C 4.70 (L) 12/04/2023           Assessment and Plan:       S/P ORIF (open reduction internal " fixation) fracture, left ankle    Closed fracture of left ankle    Ankle fracture      Plan  1. PT/OT, nonweightbearing left lower extremity  2. Pain control-prns   3. IS-encourage  4. DVT proph- mechanicals, aspirin  5. Bowel regimen  6. Resume home medications as appropriate  7. Monitor post-op labs  8. DC planning for home.      Dragon disclaimer:  Part of this encounter note is an electronic transcription/translation of spoken language to printed text. The electronic translation of spoken language may permit erroneous, or at times, nonsensical words or phrases to be inadvertently transcribed; Although I have reviewed the note for such errors, some may still exist.    Tyler Fletcher MD  12/05/23  15:13 EST

## 2023-12-05 NOTE — INTERVAL H&P NOTE
"[facility] Pre-op    Full history and physical note from office is attached.    /85 (BP Location: Right arm, Patient Position: Sitting)   Pulse 90   Temp 97.3 °F (36.3 °C) (Temporal)   Resp 18   Ht 165.1 cm (65\")   Wt 90.3 kg (199 lb)   LMP  (LMP Unknown)   SpO2 95%   BMI 33.12 kg/m²     LAB Results:  Lab Results   Component Value Date    WBC 7.93 12/04/2023    HGB 13.6 12/04/2023    HCT 40.1 12/04/2023    MCV 92.2 12/04/2023     12/04/2023    NEUTROABS 5.69 12/04/2023    GLUCOSE 100 (H) 12/04/2023    BUN 15 12/04/2023    CREATININE 0.83 12/04/2023    EGFRIFNONA 74 02/02/2021     12/04/2023    K 3.8 12/04/2023     12/04/2023    CO2 26.0 12/04/2023    CALCIUM 9.1 12/04/2023    ALBUMIN 4.60 12/26/2020    AST 13 12/26/2020    ALT 13 12/26/2020    BILITOT 0.3 12/26/2020     Study Result    Narrative & Impression   CT ANKLE LEFT WO CONTRAST     Date of Exam: 12/1/2023 4:16 PM EST     Indication: ankle fracture, surgical planning.     Comparison: Left ankle radiographs dated 11/22/2023     Technique: Axial CT images were obtained of the left ankle without contrast administration.  Reconstructed coronal and sagittal images were also obtained. Automated exposure control and iterative construction methods were used.        Findings:  There is a comminuted, oblique fracture of the distal fibula at and above the level of the syndesmosis. There is an oblique fracture through the base of the medial malleolus with fracture lucency measuring up to 5 mm anteriorly. There is a minimally   displaced fracture of the posterior malleolus. Ankle mortise appears grossly aligned. No fracture is identified within the bones of the foot. There is soft tissue swelling about the ankle. Plantar calcaneal spur is present. Splint is in place.     IMPRESSION:  Impression:  Trimalleolar fracture of the left ankle. Please see above for additional details.     Cancer Staging (if applicable)  Cancer " Patient: __ yes __no __unknown__N/A; If yes, clinical stage T:__ N:__M:__, stage group or __N/A      Impression: Closed fracture of left ankle      Plan: ORIF ankle, possible syndesmotic fixation - LEFT       ANTOINETTE Peralta   12/5/2023   09:26 EST

## 2023-12-05 NOTE — ANESTHESIA PROCEDURE NOTES
Airway  Urgency: elective    Date/Time: 12/5/2023 11:39 AM  Airway not difficult    General Information and Staff    Patient location during procedure: OR  CRNA/CAA: Edgar Loomis CRNA    Indications and Patient Condition  Indications for airway management: airway protection    Preoxygenated: yes  Mask difficulty assessment: 1 - vent by mask    Final Airway Details  Final airway type: supraglottic airway      Successful airway: I-gel  Size 4     Number of attempts at approach: 1  Assessment: lips, teeth, and gum same as pre-op    Additional Comments  LMA placed without difficulty, ventilation with assist, equal breath sounds and symmetric chest rise and fall

## 2023-12-05 NOTE — DISCHARGE INSTRUCTIONS
Edgar De La Paz MD  Orthopedic Foot & Ankle Surgeon  [facility]    Diagnosis: Closed fracture of left ankle  Procedure Performed: Procedure(s) (LRB):  ORIF ankle, possible syndesmotic fixation - LEFT (Left)    What to Expect After Surgery  Diet after surgery:  Resume your diet gradually.  Begin with clear liquids and gradually progress as you feel ready.  Surgical Site Care:  Please remain in your post-operative dressing/splint until your first post op appointment with Dr. De La Paz.  Please make sure to keep your post-operative dressing clean and dry.  Please use a shower bag (e.g., [email or web address]) when showering or bathing to keep things dry. Wet padding can cause skin breakdown.  Do not stick anything down your cast or splint.  If you sustain a scratch, you are at higher risk for infection as casts and splints harbor more bacteria.  Follow Up Appointment: Please call the [facility] at [phone] or Dr. De La Paz's schedulers within two (2) days of your discharge to /confirm/verify your follow-up appointment date/time with Dr. De La Paz.  Typically, Dr. De La Paz will want to see you 14-21 days after surgery for a post-operative follow-up appointment - before 14 days, the sutures may have to stay in and you will need to return in the 14-21 day window again.  Please arrive ~15 minutes prior to your appointment time to take care of any paperwork.      Activity Precautions:  Elevate the leg above your heart as much as possible for the first two weeks after surgery.  If the leg is higher than your heart, gravity helps decrease swelling, decrease pain, and improve blood flow.  If the leg is below your heart, swelling increases, pain can worsen, and your wound is at greater risk of infection.  Keep all weight off of your surgical leg until cleared by your physician.  Use ice packs intermittently (20 minutes on, 20 minutes off) to reduce pain and swelling, however, use  extreme caution with icing if your block is still in effect.  Make sure to have a barrier between your skin and the ice pack to avoid frost bite.   If you are in a post-operative splint, you can wiggle your toes to help push swelling to your lymph ducts, but do not try to move your ankle.      Pain Management  Nerve Blocks:  to help control pain after surgery, you may have received a nerve block where the anesthesiologist injected numbing medication around the nerves that send pain signals from your foot or ankle to your brain.  This helps you feel little to no pain.   The time a nerve block lasts varies from person to person.  Often, they will last between 12 and 24 hours but could last days.  You may not be able to move your foot and/or ankle during this time.  As the block medication wears off, you may feel a tingling sensation as the nerves start to wake up.  Keep your foot and ankle safe while it is numb.  Avoid excessive heating,  scratching, etc. until the block has completely worn off.  If icing the ankle or foot, watch the toes closely to ensure that they do not become discolored (i.e., white, red or blue)  Even if you still feel no pain in your leg before you go to bed, take a dose of your narcotic medication before you go to sleep.  You do not want to wake up with the block having worn off and being behind on pain control - it is quite difficult to 'catch up' again.  Pain Medication:    Acetaminophen (Tylenol) 500 mg tablets are typically your baseline pain medication.  Take this tablet up to once every 4 hours. Do not exceed 3,000 mg of acetaminophen daily.  You have been provided Oxycodone immediate release tablets as your initial narcotic pain reliever. Take doses of this medication if you are having severe breakthrough pain uncontrolled by the Tylenol alone. Typically, patients are taking it every 4 hours for the first day or two after surgery.  Actively wean down your use of this medication after the  third day after surgery.  Note that it takes about 30-45 minutes for oral pain medication to take effect.  DO NOT drink alcoholic beverages, use recreational drugs, or use prescription sedative medications when taking pain medication.  DO NOT operate heavy machinery/drive while taking opioids.  To avoid the risk of nausea, please make sure that you are taking your medication with food.  You may experience light headedness while taking your pain medication.  Make sure you drink plenty of water while taking narcotic pain medication to stay well hydrated and help avoid constipation.    You have been provided a Docusate prescription to help with constipation that can be a side effect of taking narcotics.  Take that medication as needed.  You have been provided a Zofran prescription to help with nausea that you can take as needed.  Itching is a common side effect to pain medication usage.  If you have an associated rash with the itching, please contact your provider.       When to Call Your Physician  Common or Normal Post-Operative Reactions:  Low grade fever (approximately 100.5 degrees) for up to one week.  Small amount of blood or fluid leaking from the surgical site or dressing  Bruising along the surgical extremity  Swelling around the surgical site and surrounding area  The reactions listed above are NORMAL, but you want to call your surgeon if any of these reactions persist.  Symptoms to Report:  Please report any of the following signs to your surgeon:  Signs of infection:  Redness or pain around your incision (if you cannot see your incision, red streaking up your extremity should be reported).  Thick, dark yellow or foul-smelling drainage at the incision site or from your dressing.  Temperature measured over 101.5 degrees for more than 24 hours despite Tylenol.  Signs of Decreased Circulation to the Ankle and Foot:  Coldness of ankle and foot  Foot or leg turning pale  Tingling/numbness (after the block has  fully resolved)  Sustained increases in pain uncontrolled by medication  Toenail bed turns blue in color  Blood Clots:  Although rare, please be aware and utilize the recommendations below to avoid getting a blood clot.  Prevention of deep vein thrombus DVT (blood clots):  You have been given a prescription for daily Aspirin to help prevent blood clot formation.  Get up 4-5 times during the daytime and walk/crutch/walker across the room to keep the blood circulating in your legs. (Maintain any weightbearing restrictions, of course)  Wiggle your toes frequently if you are in a splint or case  Notify your physician if you are a nicotine user, on hormone replacement therapy medications, are taking birth control, or have a history of blood clots as these can increase your risk of developing a blood clot.  Notify your provider if you develop pain or tenderness in your calf muscle    If you have any additional questions, please contact Dr. De La Paz's staff the [facility] at [phone]    IF YOU HAVE AN EMERGENCY, i.e., SHORTNESS OF BREATH, CHEST PAIN, OR SYMPTOMS SEVERE IN NATURE, PLEASE CALL [phone] OR VISIT YOUR LOCAL EMERGENCY ROOM               InfuBLOCK - Patient Information    What is a pain pump?  The InfuBLOCK pump delivers post-operative, non-narcotic, numbing medication to the nerve near the surgical site for pain relief.     Where can I find information about my pain pump?           For more information about your pain pump, scan the QR code.  For additional patient resources, visit [email or web address].                                                                                               While your physician is your primary source for information about your treatment there may be times during your treatment that you need assistance with your infusion pump.     If you need assistance take the following steps:    The [employer] Nursing Hotline is  Here for You 24/7.  Please call 1-920.132.4342 for the following concerns or complications:    Answers to questions about your infusion pump                 Tubing disconnect  Assistance with pump alarms                                                      Dislodged catheter  Excessive leakage noted from pump                                         Inadequate pain control    2.   [facility] Anesthesia Acute Pain Service: [phone] is available 24/7 for any further needs or concerns about medication or pain control.     -------------------------------------------------------------------------    Nerve Catheter Removal Instructions  When your device is empty:    Remove your catheter by pulling the dressing off slowly (like you would remove a regular bandage). The catheter should pull right out of the skin.  Check that the BLUE tip is intact.                                                                                     If the catheter is stuck, reposition your   extremity and pull slowly until removed.  *If catheter is HURTING and WON'T come out, stop and call [phone] for further assistance.    Remove medication bag from the black carrying case.  Cut the tubing on right and left side of pump, and discard the medication bag and tubing into garbage.  Place the pump and black carrying case into the plastic bag and then place this into the return box.  Seal box with blue stickers and return to [place] postal service. THIS IS PRE-PAID POSTAGE.        -------------------------------------------------------------------------    [facility] COLD THERAPY - PATIENT INSTRUCTION SHEET    Cold Compression Therapy for your comfort and rehabilitation  Your caregivers want you to be productive in your rehab and comfortable during your stay. In keeping with those goals, you will be receiving an [facility] Cold Therapy Wrap to help ease post-operative pain and swelling that might keep you from getting back on track! Your [facility] Cold  Therapy Wrap is effective and simple-to-use, and you will be encouraged to apply it throughout your hospital stay and at home through the duration of your recovery.    When you are ready to go home  Be sure to take your SMI Cold Therapy Wrap and both sets of Gel Bags with you for continued comfort and use throughout your rehabilitation. If you don't already have them, ask your nurse or aide to retrieve your SMI Gel Bags from the patient freezer.    Home use precautions  Always follow your medical professional's application instructions upon discharge. Your SMI Cold Therapy Wrap and Gel Bags are designed to last for months following your surgery. Never heat the Gel Bags unless specified by your healthcare provider. Supervision is advised when using this product on children or geriatric patients. To avoid danger of suffocation, please keep the outer plastic packaging away from children & pets.    Cold Therapy Instructions  Place Gel Bags in a freezer set ¾ of the way to max temperature for at least (4) hours. For best results, lay the Gel Bags flat and [id number] in the freezer. Once frozen, slide Gel Bags into the gel pouch and secure your wrap to the affected area with the straps.  Gel wraps that have been stored in a freezer for an extended period of time may require a (10) minute period of softening up in a room temperature environment before application.  The gel pouch acts as a protective barrier. NEVER place frozen bags directly onto skin, as this may cause frostbite injury.  The SMI Cold Therapy Wrap is designed to be able to be worm while ambulating. The compression straps can be secured well enough so that the Wrap won't fall off while moving.  Wrap Application Videos can be viewed at [email or web address].  An additional protective barrier such as clothing, a washcloth, hand-towel or pillowcase may be used during prolonged treatment applications.  The Gel-Pouch and Wrap are both Latex-Free and the Gel  Bag ingredients are non toxic.    SMI Wrap care instructions  The [facility] Cold Therapy Wrap may be hand washed and hung to dry when needed.    [facility] re-order information  Additional [facility] body specific wraps and/or Gel Bags can be re-ordered from [email or web address] or call [phone] ([phone])

## 2023-12-05 NOTE — OP NOTE
ORTHOPAEDIC SURGERY OPERATIVE REPORT    Location of Surgery: [facility]    Patient Name:  Lluvia Kumar  YOB: 1970    Date of Surgery:  12/5/2023     Pre-op Diagnosis:   Closed fracture of left ankle, initial encounter [S82.892A]    Post-op Diagnosis:      Post-Op Diagnosis Codes:     * Closed fracture of left ankle, initial encounter [S82.892A]    Procedure/CPT® Codes:  1. ORIF trimal (no posterior mal), 68183   2. ORIF Syndesmosis, 12753  3. Stress fluoroscopy ankle 33926  4. Application short leg splint, 16723 2 0    Staff:  Surgeon(s):  Edgar De La Paz MD  Assistant: Leila Barber PA-C    Anesthesia: General with Block    Estimated Blood Loss: minimal    Specimen:          None    Complications:   None    CLINICAL HISTORY:  Lluvia Kumar is a 53 y.o. female with the above condition. Discussed operative and non-operative treatment options and risks including but not limited to pain, infection, bleeding, damage to surrounding structures, and need for additional procedures.  After all questions were fully answered, Lluvia Kumar understood the risks and elected to proceed, and informed consent was obtained. The patient was marked with indelible marking pen after verifying correct side, site, and procedure.      DESCRIPTION OF PROCEDURE:   The patient was identified in the pre-operative area where correct procedure, site, and patient were verified. The patient was brought to the operating theater in stable condition and was transferred to the operative table where they remained in the supine position for the entirety of the case. Preoperative timeout was taken to ensure the correct identity, operative procedure, and location. General anesthesia was then administered. The patient received appropriate weight based IV antibiotics within 30 minutes of skin incision.  All bony prominences well-padded. The left leg was then prepped and draped in the standard sterile  fashion. After Esmarch exsanguination, the tourniquet was inflated.     A lateral incision over the fibula was made. Blunt dissection was carried through the subcutaneous tissues down to the periosteal layer, taking care to identify any branches of the superficial peroneal nerve in the field.  The fracture site was identified and the edges cleaned in preparation for reduction.   The fibula was reduced and held with temporary instrumentation.  The reduction was checked on fluoroscopy.     An Arthrex distal fibular locking plate was selected and placed on the fibula.  Plate position was confirmed by fluoroscopy and the plate was affixed to the fibula with 4 locking screws distally and 3 nonlocking screws proximally.    We next turned our attention to the medial malleolus.  Blunt dissection was carried through the subcutaneous tissues down to the periosteal layer taking care to identify any branches of the saphenous nerve and vein in the field.  Fracture site was identified and the edges cleaned in preparation for reduction.  The medial malleolus was reduced and held with temporary instrumentation.  The reduction was checked on fluoroscopy.  2 4-0 cannulated lag screws were then placed across the medial malleolar fracture site.  Screw position was confirmed by fluoroscopy and found to be satisfactory.     An intraoperative stress test under fluorscopy was performed which demonstrated that the syndesmosis was disrupted and the ankle unstable.     Syndesmotic fixation was achieved with 1 Arthrex tight rope.     Final fluorosopic views were used to examine the fixation and ankle.  Reduction was found to be anatomic and hardware was in proper position, all screw lengths were appropriate.     At this point we irrigated all wounds with sterile saline solution.  The periosteal layers were closed with 3-0 Monocryl suture. Subcutaneous layers were closed with 3-0 Monocryl.  The skin was closed with 3-0 nylon.  A sterile  dressing was applied followed by well-padded 3-way splint.  The ankle was splinted in neutral.  The tourniquet was deflated.      The patient tolerated the procedure well no with acute complications identified.  All sponge & needle counts were correct x2 prior to procedure conclusion.  Patient was awoken from anesthesia and transferred back to the PACU without complication.     Counts:    Sponge: Correct    Needle: Correct    Sharp: Correct    Instrument: Correct     POSTOPERATIVE PLAN:   -Admit  to floor  -NWB operative extremity  -Advance diet as tolerated  -Keep splint/operative dressing clean and dry  -DVT prophylaxis, mechanical and ASA, home on ASA  -Postoperative antibiotics  -Pain control  -PT/OT  -Elevate operative extremity  -Consult medicine, appreciate recs  -Likely plan for discharge tomorrow     Implants:    Implant Name Type Inv. Item Serial No.  Lot No. LRB No. Used Action   PLT FIB/ANKL LK DIST 6HL 104MM LT - [id number] Implant PLT FIB/ANKL LK DIST 6HL 104MM LT  [employer]  Left 1 Implanted   GW TROC TP 1.67P338SE - [id number] Implant GW TROC TP [id number]  [employer]  Left 2 Implanted   SCRW YANIRA LP SS 3.5X14MM - [id number] Implant SCRW YANIRA LP SS 3.5X14MM  [employer]  Left 3 Implanted   SCRW COMPR KREULOCK LK FUL/THRD SS 2.7X12MM - [id number] Implant SCRW COMPR KREULOCK LK FUL/THRD SS 2.7X12MM  [employer]X  Left 1 Implanted   SCRW COMPR KREULOCK LK FUL/THRD SS 2.7X14MM - [id number] Implant SCRW COMPR KREULOCK LK FUL/THRD SS 2.7X14MM  [employer]  Left 1 Implanted   SCRW COMPR KREULOCK LK FUL/THRD SS 2.7X16MM - [id number] Implant SCRW COMPR KREULOCK LK FUL/THRD SS 2.7X16MM  [employer]X  Left 2 Implanted   SYS SYNDESMOSIS REPR ANKL TIGHTROPE/XP KNOTLS SS - [id number] Implant SYS SYNDESMOSIS REPR ANKL TIGHTROPE/XP KNOTLS SS  ARTH[employer]X [id number] Left 1 Implanted   SCRW LISA LP THRD/LNG SS 4X48MM - [id number] Implant SCRW LISA LP THRD/LNG SS 4X48MM  [employer]  Left 2 Implanted       Assistant: Leila Barber  SALOME Marsh was responsible for performing the following activities: Retraction, Suction, Irrigation, Suturing, Closing, and Placing Dressing and their skilled assistance was necessary for the success of this case.     Edgar De La Paz MD     Date: 12/5/2023  Time: 14:48 EST  Electronically signed by Edgar De La Paz MD, 12/05/23, 2:48 PM EST.

## 2023-12-05 NOTE — ANESTHESIA POSTPROCEDURE EVALUATION
Patient: Lluvia Kumar    Procedure Summary       Date: 12/05/23 Room / Location:  [facility] OR  /  [facility] OR    Anesthesia Start: 1134 Anesthesia Stop: 1425    Procedure: ORIF ankle, possible syndesmotic fixation - LEFT (Left: Ankle) Diagnosis:       Closed fracture of left ankle, initial encounter      (Closed fracture of left ankle, initial encounter [S82.894Z])    Surgeons: Edgar De La Paz MD Provider: Shilo Hurt MD    Anesthesia Type: general ASA Status: 2            Anesthesia Type: general    Vitals  Vitals Value Taken Time   /72 12/05/23 1421   Temp 98 °F (36.7 °C) 12/05/23 1421   Pulse 87 12/05/23 1425   Resp 16 12/05/23 1421   SpO2 98 % 12/05/23 1425   Vitals shown include unfiled device data.        Post Anesthesia Care and Evaluation    Patient location during evaluation: PACU  Patient participation: complete - patient participated  Level of consciousness: awake and alert  Pain management: adequate    Airway patency: patent  Anesthetic complications: No anesthetic complications  PONV Status: none  Cardiovascular status: hemodynamically stable and acceptable  Respiratory status: nonlabored ventilation, acceptable and nasal cannula  Hydration status: acceptable    Comments: 118/72 99% rr12 87 98.0

## 2023-12-05 NOTE — ANESTHESIA PROCEDURE NOTES
Peripheral Block      Patient reassessed immediately prior to procedure    Patient location during procedure: pre-op  Reason for block: at surgeon's request and post-op pain management  Performed by  CRNA/CAA: Joey Haddad, CRNA  Assisted by: Lisa Linton RN  Preanesthetic Checklist  Completed: patient identified, IV checked, site marked, risks and benefits discussed, surgical consent, monitors and equipment checked, pre-op evaluation and timeout performed  Prep:  Pt Position: right lateral decubitus  Sterile barriers:cap, gloves, mask and washed/disinfected hands  Prep: ChloraPrep  Patient monitoring: blood pressure monitoring, continuous pulse oximetry and EKG  Procedure    Sedation: yes  Performed under: local infiltration  Guidance:ultrasound guided    ULTRASOUND INTERPRETATION.  Using ultrasound guidance a 20 G gauge needle was placed in close proximity to the nerve, at which point, under ultrasound guidance anesthetic was injected in the area of the nerve and spread of the anesthesia was seen on ultrasound in close proximity thereto.  There were no abnormalities seen on ultrasound; a digital image was taken; and the patient tolerated the procedure with no complications. Images:still images obtained, printed/placed on chart    Laterality:left  Block Type:popliteal  Injection Technique:catheter  Needle Type:echogenic and Tuohy  Needle Gauge:18 G  Resistance on Injection: none  Catheter Size:20 G  Cath Depth at skin: 10 cm    Medications Used: bupivacaine PF (MARCAINE) 0.25 % injection - Injection   30 mL - 12/5/2023 10:58:00 AM      Medications  Preservative Free Saline:5ml    Post Assessment  Injection Assessment: negative aspiration for heme, no paresthesia on injection and incremental injection  Patient Tolerance:comfortable throughout block  Complications:no  Additional Notes  CATHETER                               A high-frequency linear transducer, with sterile cover, was placed in the popliteal  "fossa to identify the popliteal artery and vein, Tibial nerve (TN) and Common Peroneal nerve (CP). The transducer was then moved in a cephalad fashion to observe the TN and CP nerve bifurcation to form the Sciatic Nerve. The insertion site was prepped and draped in sterile fashion. Skin and cutaneous tissue was infiltrated with 2-5 ml of 1% Lidocaine. Using ultrasound-guidance, an 18-gauge Contiplex Ultra 360 Touhy needle was advanced in plane from lateral to medial. Preservative-free normal saline was utilized for hydro-dissection of tissue, advancement of Touhy needle, and to confirm final needle placement posterior to the nerves. Local anesthetic injection spread, in incremental 3-5 ml injections, to surround both nerve structures. Aspiration every 5 ml to prevent intravascular injection. Injection was completed with negative aspiration of blood and negative intravascular injection. Injection pressures were normal with minimal resistance. A 20-gauge Contiplex Echo catheter was placed through the needle and advance out the tip of the Touhy 1-3 cm. The Touhy needle was then removed, and final catheter position verified (Below/above or Anterior/Posterior) the nerve structures. The catheter was secured in the usual fashion with skin glue, benzoin, steri-strips, CHG tegaderm and Label noting \"Nerve Block Catheter\". Jerk tape applied at yellow connector and catheter connection.              "

## 2023-12-05 NOTE — ANESTHESIA PREPROCEDURE EVALUATION
Anesthesia Evaluation                  Airway   Mallampati: I  TM distance: >3 FB  Neck ROM: full  No difficulty expected  Dental      Pulmonary    Cardiovascular     ECG reviewed        Neuro/Psych  (+) psychiatric history Anxiety and Depression  GI/Hepatic/Renal/Endo    (+) obesity, GERD    Musculoskeletal     Abdominal    Substance History      OB/GYN          Other                    Anesthesia Plan    ASA 2     general     (Blocks as indicated)  intravenous induction     Anesthetic plan, risks, benefits, and alternatives have been provided, discussed and informed consent has been obtained with: patient.    Plan discussed with CRNA.    CODE STATUS:

## 2023-12-05 NOTE — ANESTHESIA PROCEDURE NOTES
Peripheral Block      Patient reassessed immediately prior to procedure    Reason for block: at surgeon's request and post-op pain management  Performed by  CRNA/CAA: Joey Haddad, CRNA  Assisted by: Lisa Linton RN  Preanesthetic Checklist  Completed: patient identified, IV checked, site marked, risks and benefits discussed, surgical consent, monitors and equipment checked, pre-op evaluation and timeout performed  Prep:  Pt Position: supine  Sterile barriers:cap, gloves, mask, sterile barriers and washed/disinfected hands  Prep: ChloraPrep  Patient monitoring: blood pressure monitoring, continuous pulse oximetry and EKG  Procedure  Performed under: spinal  Guidance:ultrasound guided    ULTRASOUND INTERPRETATION.  Using ultrasound guidance a 20 G gauge needle was placed in close proximity to the nerve, at which point, under ultrasound guidance anesthetic was injected in the area of the nerve and spread of the anesthesia was seen on ultrasound in close proximity thereto.  There were no abnormalities seen on ultrasound; a digital image was taken; and the patient tolerated the procedure with no complications. Images:still images obtained, printed/placed on chart    Laterality:left  Block Type:adductor canal block  Injection Technique:single-shot  Needle Type:Tuohy and echogenic  Needle Gauge:18 G  Resistance on Injection: none  Catheter Size:20 G (20g)    Medications Used: fentaNYL citrate (PF) (SUBLIMAZE) injection - Intravenous   100 mcg - 12/5/2023 9:36:00 AM  bupivacaine PF (MARCAINE) 0.25 % injection - Injection   30 mL - 12/5/2023 9:46:00 AM  dexamethasone sodium phosphate injection - Injection   2 mg - 12/5/2023 9:36:00 AM      Medications  Preservative Free Saline:5ml    Post Assessment  Injection Assessment: negative aspiration for heme, incremental injection and no paresthesia on injection  Patient Tolerance:comfortable throughout block  Complications:no  Additional Notes  SINGLE shot   A  "high-frequency linear transducer, with sterile cover, was placed on the anterior mid-thigh (between the anterior superior iliac spine and patella). The transducer was then moved medially to identify the Sartorius muscle (Jakob), Vastus Medialis muscle (VMM), Superficial Femoral Artery (SFA) and Vein. The transducer was then moved cephalad or caudad to position the SFA in the middle of the Jakob. The insertion site was prepped and draped in sterile fashion. Skin and cutaneous tissue was infiltrated with 2-5 ml of 1% Lidocaine. Using ultrasound-guidance, a 20-gauge B-Hill 4\" Ultraplex 360 non-stimulating echogenic needle was advanced in plane from lateral to medial. Preservative-free normal saline was utilized for hydro-dissection of tissue, advancement of needle, and to confirm needle placement below the fascial plane of the Jakob where the Nerve to the VMM is located. Local anesthetic (LA) 5 ml deposited here. The needle continues its path lateral to the SFA at the level of the Saphenous Nerve. The remainder of the LA was deposited at the 10-11 o'clock position of the SFA. This injection created a space between the Jakob and the SFA. Aspiration every 5 ml to prevent intravascular injection. Injection was completed with negative aspiration of blood and negative intravascular injection. Injection pressures were normal with minimal resistance.             "

## 2023-12-06 VITALS
DIASTOLIC BLOOD PRESSURE: 79 MMHG | RESPIRATION RATE: 18 BRPM | WEIGHT: 199 LBS | SYSTOLIC BLOOD PRESSURE: 110 MMHG | OXYGEN SATURATION: 97 % | TEMPERATURE: 98.4 F | HEIGHT: 65 IN | HEART RATE: 75 BPM | BODY MASS INDEX: 33.15 KG/M2

## 2023-12-06 PROBLEM — G89.18 ACUTE POSTOPERATIVE PAIN: Status: ACTIVE | Noted: 2023-12-06

## 2023-12-06 LAB
DEPRECATED RDW RBC AUTO: 41.2 FL (ref 37–54)
ERYTHROCYTE [DISTWIDTH] IN BLOOD BY AUTOMATED COUNT: 12.2 % (ref 12.3–15.4)
HCT VFR BLD AUTO: 34.7 % (ref 34–46.6)
HGB BLD-MCNC: 11.9 G/DL (ref 12–15.9)
MCH RBC QN AUTO: 31.6 PG (ref 26.6–33)
MCHC RBC AUTO-ENTMCNC: 34.3 G/DL (ref 31.5–35.7)
MCV RBC AUTO: 92.3 FL (ref 79–97)
PLATELET # BLD AUTO: 196 10*3/MM3 (ref 140–450)
PMV BLD AUTO: 11 FL (ref 6–12)
RBC # BLD AUTO: 3.76 10*6/MM3 (ref 3.77–5.28)
WBC NRBC COR # BLD AUTO: 12.93 10*3/MM3 (ref 3.4–10.8)

## 2023-12-06 PROCEDURE — 97166 OT EVAL MOD COMPLEX 45 MIN: CPT

## 2023-12-06 PROCEDURE — 25010000002 CEFAZOLIN PER 500 MG: Performed by: ORTHOPAEDIC SURGERY

## 2023-12-06 PROCEDURE — 25010000002 ONDANSETRON PER 1 MG: Performed by: ORTHOPAEDIC SURGERY

## 2023-12-06 PROCEDURE — 97162 PT EVAL MOD COMPLEX 30 MIN: CPT

## 2023-12-06 PROCEDURE — 99024 POSTOP FOLLOW-UP VISIT: CPT | Performed by: ORTHOPAEDIC SURGERY

## 2023-12-06 PROCEDURE — 85027 COMPLETE CBC AUTOMATED: CPT | Performed by: ORTHOPAEDIC SURGERY

## 2023-12-06 PROCEDURE — 97116 GAIT TRAINING THERAPY: CPT

## 2023-12-06 PROCEDURE — 97535 SELF CARE MNGMENT TRAINING: CPT

## 2023-12-06 RX ORDER — ROPIVACAINE HYDROCHLORIDE 2 MG/ML
1 INJECTION, SOLUTION EPIDURAL; INFILTRATION; PERINEURAL CONTINUOUS
Start: 2023-12-06

## 2023-12-06 RX ORDER — ACETAMINOPHEN 650 MG/1
650 SUPPOSITORY RECTAL EVERY 6 HOURS SCHEDULED
Status: DISCONTINUED | OUTPATIENT
Start: 2023-12-06 | End: 2023-12-06 | Stop reason: HOSPADM

## 2023-12-06 RX ORDER — PROCHLORPERAZINE 25 MG
25 SUPPOSITORY, RECTAL RECTAL EVERY 12 HOURS PRN
Status: DISCONTINUED | OUTPATIENT
Start: 2023-12-06 | End: 2023-12-06 | Stop reason: HOSPADM

## 2023-12-06 RX ORDER — DROPERIDOL 2.5 MG/ML
0.62 INJECTION, SOLUTION INTRAMUSCULAR; INTRAVENOUS EVERY 6 HOURS PRN
Status: DISCONTINUED | OUTPATIENT
Start: 2023-12-06 | End: 2023-12-06

## 2023-12-06 RX ORDER — PROCHLORPERAZINE MALEATE 5 MG/1
5 TABLET ORAL EVERY 6 HOURS PRN
Status: DISCONTINUED | OUTPATIENT
Start: 2023-12-06 | End: 2023-12-06 | Stop reason: HOSPADM

## 2023-12-06 RX ORDER — ASPIRIN 81 MG/1
81 TABLET ORAL DAILY
Qty: 30 TABLET | Refills: 0 | Status: SHIPPED | OUTPATIENT
Start: 2023-12-06 | End: 2024-01-05

## 2023-12-06 RX ORDER — ACETAMINOPHEN 500 MG
500 TABLET ORAL EVERY 6 HOURS PRN
Qty: 30 TABLET | Refills: 0 | Status: SHIPPED | OUTPATIENT
Start: 2023-12-06

## 2023-12-06 RX ORDER — PROCHLORPERAZINE EDISYLATE 5 MG/ML
5 INJECTION INTRAMUSCULAR; INTRAVENOUS EVERY 6 HOURS PRN
Status: DISCONTINUED | OUTPATIENT
Start: 2023-12-06 | End: 2023-12-06 | Stop reason: HOSPADM

## 2023-12-06 RX ORDER — PROCHLORPERAZINE MALEATE 5 MG/1
5 TABLET ORAL EVERY 6 HOURS PRN
Qty: 20 TABLET | Refills: 0 | Status: SHIPPED | OUTPATIENT
Start: 2023-12-06

## 2023-12-06 RX ORDER — ONDANSETRON 4 MG/1
4 TABLET, FILM COATED ORAL EVERY 8 HOURS PRN
Qty: 15 TABLET | Refills: 0 | Status: SHIPPED | OUTPATIENT
Start: 2023-12-06

## 2023-12-06 RX ORDER — DOCUSATE SODIUM 100 MG/1
100 CAPSULE, LIQUID FILLED ORAL 2 TIMES DAILY
Qty: 30 CAPSULE | Refills: 0 | Status: SHIPPED | OUTPATIENT
Start: 2023-12-06 | End: 2023-12-21

## 2023-12-06 RX ORDER — OXYCODONE HYDROCHLORIDE 5 MG/1
5 TABLET ORAL EVERY 4 HOURS PRN
Qty: 40 TABLET | Refills: 0 | Status: SHIPPED | OUTPATIENT
Start: 2023-12-06

## 2023-12-06 RX ORDER — ACETAMINOPHEN 500 MG
1000 TABLET ORAL EVERY 6 HOURS SCHEDULED
Status: DISCONTINUED | OUTPATIENT
Start: 2023-12-06 | End: 2023-12-06 | Stop reason: HOSPADM

## 2023-12-06 RX ADMIN — ONDANSETRON 4 MG: 2 INJECTION INTRAMUSCULAR; INTRAVENOUS at 09:00

## 2023-12-06 RX ADMIN — IPRATROPIUM BROMIDE 1 SPRAY: 42 SPRAY NASAL at 09:02

## 2023-12-06 RX ADMIN — Medication 10 ML: at 09:39

## 2023-12-06 RX ADMIN — ACETAMINOPHEN 1000 MG: 500 TABLET ORAL at 14:14

## 2023-12-06 RX ADMIN — ACETAMINOPHEN 1000 MG: 500 TABLET ORAL at 09:02

## 2023-12-06 RX ADMIN — ONDANSETRON 4 MG: 2 INJECTION INTRAMUSCULAR; INTRAVENOUS at 02:54

## 2023-12-06 RX ADMIN — ASPIRIN 81 MG: 81 TABLET, COATED ORAL at 09:02

## 2023-12-06 RX ADMIN — SODIUM CHLORIDE 2 G: 900 INJECTION INTRAVENOUS at 02:54

## 2023-12-06 RX ADMIN — OXYCODONE HYDROCHLORIDE 5 MG: 5 TABLET ORAL at 02:59

## 2023-12-06 NOTE — CASE MANAGEMENT/SOCIAL WORK
Continued Stay Note  [facility]     Patient Name: Lluvia Kumar  MRN: [id number]  Today's Date: 12/6/2023    Admit Date: 12/5/2023    Plan: home with family   Discharge Plan       Row Name 12/06/23 0946       Plan    Plan home with family    Patient/Family in Agreement with Plan yes    Plan Comments Pt lives in [facility] with her . She reports she is independent with ADLs prior to admit and owns a knee scooter, cane and her  has arranged for a walker and wheelchair. Pt is followed by her PCP and has drug coverage. At this time her plan for discharge is to return home. CM is following for PT eval and will make referrals as appropriate.    Final Discharge Disposition Code 01 - home or self-care                   Discharge Codes    No documentation.                       Leela Valencia RN

## 2023-12-06 NOTE — PLAN OF CARE
Problem: Adult Inpatient Plan of Care  Goal: Plan of Care Review  Recent Flowsheet Documentation  Taken 12/6/2023 1442 by Debbie Brown OT  Progress: (IE) --  Plan of Care Reviewed With:   patient   spouse  Outcome Evaluation: OT IE completed. Pt is A/Ox4 and participates in therapy with encourgement. BUE AROM, strength, and ROM intact for activities. Pt recalls LLE NWB precautions and able to maintain with transfers and self-care. CGA STS. Set-up and CGA Squat Pvt to BSC. Requires assist for clothing mgmt. Independent for hygiene. Mod A LB dressing. Independent UB dressing. Pt/spouse deny DME needs. OT will d/c at this time. Pt is set to d/c home with spouse assist today.

## 2023-12-06 NOTE — PROGRESS NOTES
[facility]    Acute pain service Inpatient Progress Note    Patient Name: Lluvia Kumar  :  1970  MRN:  [id number]        Acute Pain  Service Inpatient Progress Note:    Analgesia:Good  Pain Score:4/10  LOC: alert and awake  Resp Status: room air and spontaneous ventilation  Cardiac: VS stable  Side Effects:None  Catheter Site:clean, dry and dressing intact  Cath type: peripheral nerve cath(InfuSystem)  Infusion rate: Ext/Pop: Basal: 1ml/hr, PIB: 5ml q 2 h, PCA: 5 ml q 30 min  Dosing/Volume: ropivacaine 0.2%  Catheter Plan:Catheter to remain Insitu, Continue catheter infusion rate unchanged and Bolus Catheter

## 2023-12-06 NOTE — THERAPY DISCHARGE NOTE
Acute Care - Occupational Therapy Discharge  [facility]    Patient Name: Lluvia Kumar  : 1970    MRN: [id number]                              Today's Date: 2023       Admit Date: 2023    Visit Dx:     ICD-10-CM ICD-9-CM   1. S/P ORIF (open reduction internal fixation) fracture, left ankle  Z98.890 V45.89    Z87.81 V15.51   2. Closed fracture of left ankle, initial encounter  M16.711C 824.8     Patient Active Problem List   Diagnosis    Women's annual routine gynecological examination    Abnormal uterine bleeding (AUB)    Fibroid uterus    Pelvic pain    Postoperative follow-up    Closed fracture of left ankle    Ankle fracture    S/P ORIF (open reduction internal fixation) fracture, left ankle    Acute postoperative pain     Past Medical History:   Diagnosis Date    Anemia     Ankle fracture 2023    Anxiety     Fibrocystic breast     Fibroids     GERD (gastroesophageal reflux disease)     History of transfusion     in college needed transfusion due to anemia no reaction    Ovarian cyst     Seasonal allergies     Wears contact lenses     Wears glasses      Past Surgical History:   Procedure Laterality Date    ANKLE OPEN REDUCTION INTERNAL FIXATION Left 2023    Procedure: ORIF ankle syndesmotic fixation - LEFT;  Surgeon: Edgar De La Paz MD;  Location: [facility];  Service: Orthopedics;  Laterality: Left;    BREAST BIOPSY Right         BREAST LUMPECTOMY Right     had 2 removed    CARPAL TUNNEL RELEASE Bilateral      SECTION  ,     COLONOSCOPY      HAND SURGERY      Trigger finger right thumb and carpal tunnel    TONSILLECTOMY  1982    TOTAL LAPAROSCOPIC HYSTERECTOMY SALPINGECTOMY N/A 2021    Procedure: TOTAL LAPAROSCOPIC HYSTERECTOMY BILATERAL SALPINGO-OOPHERECTOMY WITH DAVINCI ROBOT;  Surgeon: Sushma Acevedo MD;  Location:  [facility];  Service: DaVinci;  Laterality: N/A;    WISDOM TOOTH EXTRACTION        General Information       Row Name  12/06/23 1434          OT Time and Intention    Document Type evaluation;discharge treatment  -TB     Mode of Treatment occupational therapy;individual therapy  -TB       Row Name 12/06/23 1434          General Information    Patient Profile Reviewed yes  -TB     Prior Level of Function independent:;all household mobility;community mobility;transfer;ADL's  Pt has been NWB LLE x2 weeks. Crutches and w/c for mobility since fracture  -TB     Existing Precautions/Restrictions fall;left;non-weight bearing;other (see comments)  LLE popliteal nerve catheter  -TB     Barriers to Rehab none identified  -TB       Row Name 12/06/23 1434          Occupational Profile    Reason for Services/Referral (Occupational Profile) Occupational decline  -TB     Environmental Supports and Barriers (Occupational Profile) Pt lives with her spouse in a single story home on a basement with 2+1 steps to enter. Pt does not need to access lower level. ETS. Crutches and w/c prior.  -TB       Row Name 12/06/23 1434          Living Environment    People in Home spouse  -TB     Name(s) of People in Home   -TB       Row Name 12/06/23 [facility]          Home Main Entrance    Number of Stairs, Main Entrance three  2+1  -TB     Stair Railings, Main Entrance none  -TB       Row Name 12/06/23 1434          Stairs Within Home, Primary    Number of Stairs, Within Home, Primary none  -TB       Row Name 12/06/23 1434          Cognition    Orientation Status (Cognition) oriented x 4  -TB       Row Name 12/06/23 1434          Safety Issues, Functional Mobility    Safety Issues Affecting Function (Mobility) insight into deficits/self-awareness;safety precaution awareness;sequencing abilities  -TB     Impairments Affecting Function (Mobility) balance;strength;pain;sensation/sensory awareness  -TB     Comment, Safety Issues/Impairments (Mobility) Pt transfers with set-up and CGAx1  -TB               User Key  (r) = Recorded By, (t) = Taken By, (c) = Cosigned By       Initials Name Provider Type    TB Debbie Brown OT Occupational Therapist                   Mobility/ADL's       Row Name 12/06/23 1439          Bed Mobility    Comment, (Bed Mobility) UIC  -TB       Row Name 12/06/23 1439          Transfers    Transfers toilet transfer;sit-stand transfer  -TB     Comment, (Transfers) Pt demonstrates safe transfers  -TB       Row Name 12/06/23 1439          Sit-Stand Transfer    Sit-Stand [place] (Transfers) contact guard;1 person assist;verbal cues  -TB       Row Name 12/06/23 1439          Toilet Transfer    Type (Toilet Transfer) squat pivot  -TB     [place] Level (Toilet Transfer) set up;contact guard;1 person assist;verbal cues  -TB     Assistive Device (Toilet Transfer) commode, bedside without drop arms  -TB       Row Name 12/06/23 1439          Activities of Daily Living    BADL Assessment/Intervention upper body dressing;lower body dressing;toileting;bathing  -TB       Row Name 12/06/23 1439          Mobility    Extremity Weight-bearing Status left lower extremity  -TB     Left Lower Extremity (Weight-bearing Status) non weight-bearing (NWB)   -TB       Row Name 12/06/23 1439          Upper Body Dressing Assessment/Training    [place] Level (Upper Body Dressing) don;bra/undergarment;pull-over garment;independent  -TB     Position (Upper Body Dressing) unsupported sitting  -TB       Row Name 12/06/23 1439          Lower Body Dressing Assessment/Training    [place] Level (Lower Body Dressing) don;pants/bottoms;moderate assist (50% patient effort);verbal cues  -TB     Comment, (Lower Body Dressing) Education and cues for LLE popliteal nerve cath line mgmt to prevent accidental dislodgement.  -TB       Row Name 12/06/23 1439          Toileting Assessment/Training    [place] Level (Toileting) dependent (less than 25% patient effort);adjust/manage clothing;set up;independent;perform perineal hygiene  -TB     Position (Toileting) unsupported  sitting  -[facility] Name 12/06/23 1439          Bathing Assessment/Intervention    Comment, (Bathing) Education completed for nerve cath precautions (no showers until block removed)  -               User Key  (r) = Recorded By, (t) = Taken By, (c) = Cosigned By      Initials Name Provider Type    Debbie Herrera OT Occupational Therapist                   Obj/Interventions       Row Name 12/06/23 1442          Sensory Assessment (Somatosensory)    Sensory Assessment (Somatosensory) UE sensation intact  -[facility] Name 12/06/23 1442          Vision Assessment/Intervention    Visual Impairment/Limitations corrective lenses full-time  -       Row Name 12/06/23 1442          Range of Motion Comprehensive    General Range of Motion bilateral upper extremity ROM WFL  -TB     Comment, General Range of Motion BUE AROM intact  -[facility] Name 12/06/23 1442          Strength Comprehensive (MMT)    Comment, General Manual Muscle Testing (MMT) Assessment BUE intact for transfers and self-care performance  -[facility] Name 12/06/23 1442          Balance    Balance Assessment sitting dynamic balance;sit to stand dynamic balance;standing dynamic balance  -     Dynamic Sitting Balance independent  -TB     Position, Sitting Balance unsupported;sitting in chair  -     Sit to Stand Dynamic Balance contact guard;1-person assist;verbal cues  -     Dynamic Standing Balance contact guard;1-person assist;verbal cues  -TB     Position/Device Used, Standing Balance supported  -     Balance Interventions sitting;standing;sit to stand;supported;dynamic reaching;occupation based/functional task;UE activity with balance activity  -               User Key  (r) = Recorded By, (t) = Taken By, (c) = Cosigned By      Initials Name Provider Type    Debbie Herrera OT Occupational Therapist                   Goals/Plan    No documentation.                  Clinical Impression       [facility] Name 12/06/23 1442           Pain Assessment    Pretreatment Pain Rating 8/10  -TB     Posttreatment Pain Rating 8/10  -TB     Pain Location - Side/Orientation Left  -TB     Pain Location generalized  -TB     Pain Location - ankle;foot  -TB     Pre/Posttreatment Pain Comment Pt tolerates ADL, motivated to d/c home today  -TB     Pain Intervention(s) Ambulation/increased activity;Repositioned;Elevated  -TB       Row Name 12/06/23 1442          Plan of Care Review    Plan of Care Reviewed With patient;spouse  -TB     Progress --  IE  -TB     Outcome Evaluation OT IE completed. Pt is A/Ox4 and participates in therapy with encourgement. BUE AROM, strength, and ROM intact for activities. Pt recalls LLE NWB precautions and able to maintain with transfers and self-care. CGA STS. Set-up and CGA Squat Pvt to BSC. Requires assist for clothing mgmt. Independent for hygiene. Mod A LB dressing. Independent UB dressing. Pt/spouse deny DME needs. OT will d/c at this time. Pt is set to d/c home with spouse assist today.  -TB       Row Name 12/06/23 1442          Therapy Assessment/Plan (OT)    Therapy Frequency (OT) evaluation only  -TB       Row Name 12/06/23 1442          Therapy Plan Review/Discharge Plan (OT)    Anticipated Discharge Disposition (OT) home with assist  -TB       Row Name 12/06/23 1442          Vital Signs    Pre Systolic BP Rehab --  RN cleared OT  -TB     O2 Delivery Pre Treatment room air  -TB     Pre Patient Position Sitting  -TB     Intra Patient Position Standing  -TB     Post Patient Position Sitting  -TB       Row Name 12/06/23 1442          Positioning and Restraints    Pre-Treatment Position sitting in chair/recliner  -TB     Post Treatment Position chair  -TB     In Chair notified nsg;reclined;call light within reach;encouraged to call for assist;with family/caregiver;legs elevated;LLE elevated  -TB               User Key  (r) = Recorded By, (t) = Taken By, (c) = Cosigned By      Initials Name Provider Type    FARTUN Brown  Debbie Peters OT Occupational Therapist                   Outcome Measures       Row Name 12/06/23 1446          How much help from another is currently needed...    Putting on and taking off regular lower body clothing? 2  -TB     Bathing (including washing, rinsing, and drying) 3  -TB     Toileting (which includes using toilet bed pan or urinal) 2  -TB     Putting on and taking off regular upper body clothing 4  -TB     Taking care of personal grooming (such as brushing teeth) 4  -TB     Eating meals 4  -TB     AM-PAC 6 Clicks Score (OT) 19  -TB       Row Name 12/06/23 1104 12/06/23 0855       How much help from another person do you currently need...    Turning from your back to your side while in flat bed without using bedrails? 3  -HP 3  -AC    Moving from lying on back to sitting on the side of a flat bed without bedrails? 3  -HP 3  -AC    Moving to and from a bed to a chair (including a wheelchair)? 3  -HP 3  -AC    Standing up from a chair using your arms (e.g., wheelchair, bedside chair)? 3  -HP 3  -AC    Climbing 3-5 steps with a railing? 2  -HP 2  -AC    To walk in hospital room? 3  -HP 3  -AC    AM-PAC 6 Clicks Score (PT) 17  -HP 17  -AC    Highest Level of Mobility Goal 5 --> Static standing  -HP 5 --> Static standing  -AC      Row Name 12/06/23 1446 12/06/23 1104       Functional Assessment    Outcome Measure Options AM-PAC 6 Clicks Daily Activity (OT)  -TB AM-PAC 6 Clicks Basic Mobility (PT)  -HP              User Key  (r) = Recorded By, (t) = Taken By, (c) = Cosigned By      Initials Name Provider Type    Debbie Herrera OT Occupational Therapist    AC Margi Correa, RN Registered Nurse    HP Mervat Lozano, PT Physical Therapist                  Occupational Therapy Education       Title: PT OT SLP Therapies (In Progress)       Topic: Occupational Therapy (In Progress)       Point: ADL training (Done)       Description:   Instruct learner(s) on proper safety adaptation and remediation  techniques during self care or transfers.   Instruct in proper use of assistive devices.                  Learning Progress Summary             Patient Acceptance, E,D,TB, VU,DU by TB at 12/6/2023 1447   Family Acceptance, E,D,TB, VU,DU by TB at 12/6/2023 1447                         Point: Home exercise program (Not Started)       Description:   Instruct learner(s) on appropriate technique for monitoring, assisting and/or progressing therapeutic exercises/activities.                  Learner Progress:  Not documented in this visit.              Point: Precautions (Done)       Description:   Instruct learner(s) on prescribed precautions during self-care and functional transfers.                  Learning Progress Summary             Patient Acceptance, E,D,TB, VU,DU by TB at 12/6/2023 1447   Family Acceptance, E,D,TB, VU,DU by TB at 12/6/2023 1447                         Point: Body mechanics (Not Started)       Description:   Instruct learner(s) on proper positioning and spine alignment during self-care, functional mobility activities and/or exercises.                  Learner Progress:  Not documented in this visit.                              User Key       Initials Effective Dates Name Provider Type Discipline     07/11/23 -  Debbie Brown OT Occupational Therapist OT                  OT Recommendation and Plan  Therapy Frequency (OT): evaluation only  Plan of Care Review  Plan of Care Reviewed With: patient, spouse  Progress:  (IE)  Outcome Evaluation: OT IE completed. Pt is A/Ox4 and participates in therapy with encourgement. BUE AROM, strength, and ROM intact for activities. Pt recalls LLE NWB precautions and able to maintain with transfers and self-care. CGA STS. Set-up and CGA Squat Pvt to BSC. Requires assist for clothing mgmt. Independent for hygiene. Mod A LB dressing. Independent UB dressing. Pt/spouse deny DME needs. OT will d/c at this time. Pt is set to d/c home with spouse assist  today.  Plan of Care Reviewed With: patient, spouse  Outcome Evaluation: OT IE completed. Pt is A/Ox4 and participates in therapy with encourgement. BUE AROM, strength, and ROM intact for activities. Pt recalls LLE NWB precautions and able to maintain with transfers and self-care. CGA STS. Set-up and CGA Squat Pvt to BSC. Requires assist for clothing mgmt. Independent for hygiene. Mod A LB dressing. Independent UB dressing. Pt/spouse deny DME needs. OT will d/c at this time. Pt is set to d/c home with spouse assist today.     Time Calculation:   Evaluation Complexity (OT)  Review Occupational Profile/Medical/Therapy History Complexity: expanded/moderate complexity  Assessment, Occupational Performance/Identification of Deficit Complexity: 3-5 performance deficits  Clinical Decision Making Complexity (OT): detailed assessment/moderate complexity  Overall Complexity of Evaluation (OT): moderate complexity     Time Calculation- OT       Row Name 12/06/23 1328 12/06/23 0944          Time Calculation- OT    OT Start Time 1328  -TB --     OT Received On 12/06/23  -TB --        Timed Charges    83869 - Gait Training Minutes  -- 20  -HP     59198 - OT Self Care/Mgmt Minutes 15  -TB --        Untimed Charges    OT Eval/Re-eval Minutes 38  -TB --        Total Minutes    Timed Charges Total Minutes 15  -TB 20  -HP     Untimed Charges Total Minutes 38  -TB --      Total Minutes 53  -TB 20  -HP               User Key  (r) = Recorded By, (t) = Taken By, (c) = Cosigned By      Initials Name Provider Type    TB Debbie Brown, OT Occupational Therapist    HP Mervat Lozano, ASHWINI Physical Therapist                  Therapy Charges for Today       Code Description Service Date Service Provider Modifiers Qty    [id number]  OT SELF CARE/MGMT/TRAIN EA 15 MIN 12/6/2023 Debbie Brown OT GO 1    [id number] HC OT EVAL MOD COMPLEXITY 3 12/6/2023 Debbie Brown OT GO 1               OT Discharge  Summary  Anticipated Discharge Disposition (OT): home with assist    Debbie Brown, OT  12/6/2023

## 2023-12-06 NOTE — DISCHARGE SUMMARY
Patient Name: Lluvia Kumar  MRN: [id number]  : 1970  DOS: 2023    Attending: Edgar De La Paz MD    Primary Care Provider: Kiesha Berger PA    Date of Admission:.2023  8:22 AM    Date of Discharge:  2023    Discharge Diagnosis:   S/P ORIF (open reduction internal fixation) fracture, left ankle    Closed fracture of left ankle    Ankle fracture    Acute postoperative pain      Hospital Course    At admit:    Patient is a pleasant 53 y.o. female presented for scheduled surgery by  .      She had a recent fall for which she presented to the emergency department and was diagnosed with a trimalleolar fracture of the left ankle.  She was seen by Dr. De La Paz and opted to proceed with surgery.     Per his note (Lluvia Kumar is a 53 y.o. female with the above condition. Discussed operative and non-operative treatment options and risks including but not limited to pain, infection, bleeding, damage to surrounding structures, and need for additional procedures.  After all questions were fully answered, Lluvia Kumar understood the risks and elected to proceed, and informed consent was obtained. The patient was marked with indelible marking pen after verifying correct side, site, and procedure. )     Today she underwent ORIF of left ankle fracture under general anesthesia and a block, surgery was tolerated well, she has been admitted for further management.     Seen in PACU, doing fairly well, has some postoperative pain for which she has received pain medication by her nurse.      After admit:    Patient was provided pain medications as needed for pain control, along with popliteal nerve block infusion of Ropivacaine.    Adjustments were made to pain medications to optimize postop pain management.   Risks and benefits of opiate medications discussed with patient.  Anthony report in chart was reviewed prior to discharge.    She was seen by PT has progressed well over her stay.  She  used an IS for atelectasis prophylaxis along with mechanicals for DVT prophylaxis.  Home medications were resumed as appropriate, and labs were monitored and remained fairly stable.     With the progress she has made, she is ready for DC home today.    Keep splint clean and dry until follow up appointment with Dr. De La Paz.  She will have a popliteal nerve block (instructed on it during this admit)  Discussed with patient regarding plan and she shows understanding and agreement.     Procedures Performed    Date of Surgery:  12/5/2023     Pre-op Diagnosis:   Closed fracture of left ankle, initial encounter [S82.892A]     Post-op Diagnosis:      Post-Op Diagnosis Codes:     * Closed fracture of left ankle, initial encounter [S82.892A]     Procedure/CPT® Codes:  1. ORIF trimal (no posterior mal), 95708   2. ORIF Syndesmosis, 18663  3. Stress fluoroscopy ankle 35200  4. Application short leg splint, 86380 2 0     Staff:  Surgeon(s):  Edgar De La Paz MD  Assistant: Leila Barber PA-C    Pertinent Test Results:    I reviewed the patient's new clinical results.   Results from last 7 days   Lab Units 12/06/23  0839 12/04/23  0830   WBC 10*3/mm3 12.93* 7.93   HEMOGLOBIN g/dL 11.9* 13.6   HEMATOCRIT % 34.7 40.1   PLATELETS 10*3/mm3 196 238     Results from last 7 days   Lab Units 12/04/23  0831   SODIUM mmol/L 140   POTASSIUM mmol/L 3.8   CHLORIDE mmol/L 104   CO2 mmol/L 26.0   BUN mg/dL 15   CREATININE mg/dL 0.83   CALCIUM mg/dL 9.1   GLUCOSE mg/dL 100*       I reviewed the patient's new imaging including images and reports.      Physical therapy: Pt navigated steps with Min A x2 and axillary crutches. Assistance for support and steadiness required. Good safety awareness noted and spouse present for training. Activity limited by fatigue and pain. Recommend d/c home with 24/7 assistance and HHPT when medically appropriate. Nausea present initially with movement and pain rated 8/10 post mobility.     Discharge  "Assessment:    Vital Signs  Visit Vitals  /79 (BP Location: Right arm, Patient Position: Lying)   Pulse 75   Temp 98.4 °F (36.9 °C) (Oral)   Resp 18   Ht 165.1 cm (65\")   Wt 90.3 kg (199 lb)   LMP  (LMP Unknown)   SpO2 97%   BMI 33.12 kg/m²     Temp (24hrs), Av.2 °F (36.8 °C), Min:97.5 °F (36.4 °C), Max:99.2 °F (37.3 °C)      General Appearance:    Alert, cooperative, in no acute distress   Lungs:     Clear to auscultation,respirations regular, even and unlabored    Heart:    Regular rhythm and normal rate, normal S1 and S2   Abdomen:     Normal bowel sounds, no masses, no organomegaly, soft non-tender, non-distended, no guarding, no rebound tenderness   Extremities:   CDI splint on left leg/ankle. PNB cath present.     Pulses:   Pulses palpable and equal bilaterally   Skin:   No bleeding, bruising or rash   Neurologic:   Cranial nerves 2 - 12 grossly intact, sensation intact       Discharge Disposition: Home    Discharge Medications     Discharge Medications        New Medications        Instructions Start Date   Aspirin Low Dose 81 MG EC tablet  Generic drug: aspirin   81 mg, Oral, Daily      ondansetron 4 MG tablet  Commonly known as: Zofran   Take 1 tablet by mouth Every 8 Hours As Needed for Nausea or Vomiting.      oxyCODONE 5 MG immediate release tablet  Commonly known as: Roxicodone   Take 1 tablet by mouth Every 4 Hours As Needed for pain.      prochlorperazine 5 MG tablet  Commonly known as: COMPAZINE   5 mg, Oral, Every 6 Hours PRN      ropivacaine 0.2 % infusion (INFUSYSTEM)  Commonly known as: NAROPIN   1 mL/hr (2 mg/hr), Peripheral Nerve, Continuous      Stool Softener 100 MG capsule  Generic drug: docusate sodium   100 mg, Oral, 2 Times Daily             Changes to Medications        Instructions Start Date   acetaminophen 500 MG tablet  Commonly known as: TYLENOL  What changed:   how much to take  reasons to take this   500 mg, Oral, Every 6 Hours PRN             Continue These Medications  "       Instructions Start Date   estradiol 0.05 MG/24HR patch  Commonly known as: MINIVELLE, VIVELLE-DOT   1 patch, Transdermal, Weekly      ipratropium 0.06 % nasal spray  Commonly known as: ATROVENT   1 spray.             Stop These Medications      ondansetron ODT 4 MG disintegrating tablet  Commonly known as: ZOFRAN-ODT     oxyCODONE-acetaminophen 7.5-325 MG per tablet  Commonly known as: PERCOCET              Discharge Diet: regular diet    Activity at Discharge: CHANDNI ALVARENGA    Follow-up Appointments  Dr. De La Paz per his orders      ANTOINETTE Peralta  12/06/23  13:22 EST

## 2023-12-06 NOTE — PLAN OF CARE
Goal Outcome Evaluation:  Plan of Care Reviewed With: patient, spouse        Progress: no change  Outcome Evaluation: Pt navigated steps with Min A x2 and axillary crutches. Assistance for support and steadiness required. Good safety awareness noted and spouse present for training. Activity limited by fatigue and pain. Recommend d/c home with 24/7 assistance and HHPT when medically appropriate. Nausea present initially with movement and pain rated 8/10 post mobility.      Anticipated Discharge Disposition (PT): home with home health, home with 24/7 care

## 2023-12-06 NOTE — THERAPY EVALUATION
Patient Name: Lluvia Kumar  : 1970    MRN: [id number]                              Today's Date: 2023       Admit Date: 2023    Visit Dx:     ICD-10-CM ICD-9-CM   1. Closed fracture of left ankle, initial encounter  S82.892A 824.8     Patient Active Problem List   Diagnosis    Women's annual routine gynecological examination    Abnormal uterine bleeding (AUB)    Fibroid uterus    Pelvic pain    Postoperative follow-up    Closed fracture of left ankle    Ankle fracture    S/P ORIF (open reduction internal fixation) fracture, left ankle     Past Medical History:   Diagnosis Date    Anemia     Ankle fracture 2023    Anxiety     Fibrocystic breast     Fibroids     GERD (gastroesophageal reflux disease)     History of transfusion     in college needed transfusion due to anemia no reaction    Ovarian cyst     Seasonal allergies     Wears contact lenses     Wears glasses      Past Surgical History:   Procedure Laterality Date    BREAST BIOPSY Right         BREAST LUMPECTOMY Right     had 2 removed    CARPAL TUNNEL RELEASE Bilateral      SECTION  ,     COLONOSCOPY      HAND SURGERY      Trigger finger right thumb and carpal tunnel    TONSILLECTOMY  1982    TOTAL LAPAROSCOPIC HYSTERECTOMY SALPINGECTOMY N/A 2021    Procedure: TOTAL LAPAROSCOPIC HYSTERECTOMY BILATERAL SALPINGO-OOPHERECTOMY WITH DAVINCI ROBOT;  Surgeon: Sushma Acevedo MD;  Location: [facility];  Service: [facility];  Laterality: N/A;    WISDOM TOOTH EXTRACTION        General Information       Row Name 23 1050          Physical Therapy Time and Intention    Document Type evaluation  -HP     Mode of Treatment physical therapy  -       Row Name 23 1050          General Information    Patient Profile Reviewed yes  -HP     Prior Level of Function independent:;all household mobility;community mobility;gait;transfer;bed mobility;ADL's  pt has been NWB on LLE x2 wks  -HP     Existing  Precautions/Restrictions fall;non-weight bearing;other (see comments)  popliteal nerve cath  -     Barriers to Rehab none identified  -       Row Name 12/06/23 1050          Living Environment    People in Home spouse  -       Row Name 12/06/23 1050          Home Main Entrance    Number of Stairs, Main Entrance two  -     Stair Railings, Main Entrance none  -       Row Name 12/06/23 1050          Stairs Within Home, Primary    Number of Stairs, Within Home, Primary none  -       Row Name 12/06/23 1050          Cognition    Orientation Status (Cognition) oriented x 3  -       Row Name 12/06/23 1050          Safety Issues, Functional Mobility    Safety Issues Affecting Function (Mobility) awareness of need for assistance;safety precaution awareness;insight into deficits/self-awareness  -     Impairments Affecting Function (Mobility) balance;strength;pain;sensation/sensory awareness  -               User Key  (r) = Recorded By, (t) = Taken By, (c) = Cosigned By      Initials Name Provider Type     Mervat Lozano, PT Physical Therapist                   Mobility       Row Name 12/06/23 1052          Bed Mobility    Bed Mobility supine-sit  -     Supine-Sit [place] (Bed Mobility) minimum assist (75% patient effort);verbal cues;nonverbal cues (demo/gesture)  -     Comment, (Bed Mobility) assist for LLE management  -       Row Name 12/06/23 1052          Transfers    Comment, (Transfers) VC for hand placement and sequencing when transitioning on/off knee scooter. Pt reported discomfort with knee scooter and declined additional training. Pt prefers axillary crutches. Difficulty keeping L foot off ground secondary to weight of cast and weakness.  -       Row Name 12/06/23 1052          Bed-Chair Transfer    Bed-Chair [place] (Transfers) contact guard;nonverbal cues (demo/gesture);verbal cues;2 person assist  -     Assistive Device (Bed-Chair Transfers) walker, front-wheeled  -        Row Name 12/06/23 1052          Sit-Stand Transfer    Sit-Stand [place] (Transfers) contact guard;nonverbal cues (demo/gesture);verbal cues;2 person assist  -HP     Assistive Device (Sit-Stand Transfers) crutches, axillary;walker, front-wheeled  -       Row Name 12/06/23 1052          Gait/Stairs (Locomotion)    [place] Level (Gait) contact guard;2 person assist  -HP     Assistive Device (Gait) crutches, axillary  -HP     Distance in Feet (Gait) 6  -HP     Deviations/Abnormal Patterns (Gait) bilateral deviations;gait speed decreased  -HP     Bilateral Gait Deviations forward flexed posture  -HP     [place] Level (Stairs) minimum assist (75% patient effort);2 person assist;verbal cues;nonverbal cues (demo/gesture)  -HP     Assistive Device (Stairs) crutches, axillary  -HP     Number of Steps (Stairs) 2  -HP     Ascending Technique (Stairs) step-to-step  -HP     Descending Technique (Stairs) step-to-step  -HP     Comment, (Gait/Stairs) Pt amb to stairs with crutches. Good ability and ability to maintain NWB on LLE noted. Pt navigated 2 steps with VC for sequencing. Difficulty maintaining NWB and shifting weight onto crutches to advance RLE. Spouse present for training and education provided on safety with completing transfer. Pt and spouse verbalized understanding and reported they have been navigating the steps similarly to this for past 2 wks. Activity limited by fatigue.  -       Row Name 12/06/23 1052          Mobility    Extremity Weight-bearing Status left lower extremity  -     Left Lower Extremity (Weight-bearing Status) non weight-bearing (NWB)   -               User Key  (r) = Recorded By, (t) = Taken By, (c) = Cosigned By      Initials Name Provider Type     Mervat Lozano PT Physical Therapist                   Obj/Interventions       Row Name 12/06/23 1059          Range of Motion Comprehensive    General Range of Motion lower extremity range of motion deficits identified  -      Comment, General Range of Motion L ankle ROM limited by cast  -       Row Name 12/06/23 1059          Strength Comprehensive (MMT)    General Manual Muscle Testing (MMT) Assessment lower extremity strength deficits identified  -     Comment, General Manual Muscle Testing (MMT) Assessment Pt with difficulty performed L LAQ and SLR secondary to weight of cast and weakness. Pt able to wiggle L toes  -       Row Name 12/06/23 1059          Balance    Balance Assessment sitting static balance;sitting dynamic balance;sit to stand dynamic balance;standing static balance;standing dynamic balance  -     Static Sitting Balance standby assist  -     Dynamic Sitting Balance standby assist  -     Position, Sitting Balance sitting edge of bed  -     Static Standing Balance contact guard  -     Dynamic Standing Balance contact guard  -     Position/Device Used, Standing Balance supported;crutches, axillary;walker, rolling  -     Balance Interventions sitting;standing;sit to stand;occupation based/functional task  -       Row Name 12/06/23 1059          Sensory Assessment (Somatosensory)    Sensory Assessment (Somatosensory) LE sensation intact  -               User Key  (r) = Recorded By, (t) = Taken By, (c) = Cosigned By      Initials Name Provider Type     Mervat Lozano, PT Physical Therapist                   Goals/Plan       Row Name 12/06/23 1104          Bed Mobility Goal 1 (PT)    Activity/Assistive Device (Bed Mobility Goal 1, PT) sit to supine/supine to sit  -     [place] Level/Cues Needed (Bed Mobility Goal 1, PT) modified independence  -HP     Time Frame (Bed Mobility Goal 1, PT) long term goal (LTG);10 days  -       Row Name 12/06/23 1104          Transfer Goal 1 (PT)    Activity/Assistive Device (Transfer Goal 1, PT) sit-to-stand/stand-to-sit  -     [place] Level/Cues Needed (Transfer Goal 1, PT) modified independence  -HP     Time Frame (Transfer Goal 1, PT) long term  goal (LTG);10 days  -HP       Row Name 12/06/23 1104          Gait Training Goal 1 (PT)    Activity/Assistive Device (Gait Training Goal 1, PT) gait (walking locomotion);crutches, axillary  -HP     [place] Level (Gait Training Goal 1, PT) modified independence  -HP     Distance (Gait Training Goal 1, PT) 150  -HP     Time Frame (Gait Training Goal 1, PT) long term goal (LTG);10 days  -HP       Row Name 12/06/23 1104          Stairs Goal 1 (PT)    Activity/Assistive Device (Stairs Goal 1, PT) ascending stairs;descending stairs  -HP     [place] Level/Cues Needed (Stairs Goal 1, PT) contact guard required  -HP     Number of Stairs (Stairs Goal 1, PT) 2  -HP     Time Frame (Stairs Goal 1, PT) long term goal (LTG);10 days  -HP       Row Name 12/06/23 1104          Therapy Assessment/Plan (PT)    Planned Therapy Interventions (PT) balance training;bed mobility training;gait training;home exercise program;ROM (range of motion);patient/family education;stair training;strengthening;stretching;transfer training  -               User Key  (r) = Recorded By, (t) = Taken By, (c) = Cosigned By      Initials Name Provider Type     Mervat Lozano, PT Physical Therapist                   Clinical Impression       Row Name 12/06/23 1100          Pain    Pretreatment Pain Rating 8/10  -HP     Posttreatment Pain Rating 8/10  -HP     Pain Location - Side/Orientation Left  -     Pain Location generalized  -     Pain Location - ankle;foot  -     Pain Intervention(s) Repositioned;Elevated  -       Row Name 12/06/23 1100          Plan of Care Review    Plan of Care Reviewed With patient;spouse  -     Progress no change  -     Outcome Evaluation Pt navigated steps with Min A x2 and axillary crutches. Assistance for support and steadiness required. Good safety awareness noted and spouse present for training. Activity limited by fatigue and pain. Recommend d/c home with 24/7 assistance and HHPT when medically  appropriate. Nausea present initially with movement and pain rated 8/10 post mobility.  -       Row Name 12/06/23 1100          Therapy Assessment/Plan (PT)    Rehab Potential (PT) good, to achieve stated therapy goals  -     Criteria for Skilled Interventions Met (PT) yes;meets criteria;skilled treatment is necessary  -     Therapy Frequency (PT) daily  -       Row Name 12/06/23 1100          Vital Signs    Pre Patient Position Supine  -HP     Intra Patient Position Standing  -HP     Post Patient Position Sitting  -       Row Name 12/06/23 1100          Positioning and Restraints    Pre-Treatment Position in bed  -HP     Post Treatment Position chair  -HP     In Chair notified nsg;reclined;sitting;call light within reach;encouraged to call for assist;exit alarm on;with family/caregiver;legs elevated;LLE elevated  -               User Key  (r) = Recorded By, (t) = Taken By, (c) = Cosigned By      Initials Name Provider Type    Mervat López PT Physical Therapist                   Outcome Measures       Row Name 12/06/23 1104          How much help from another person do you currently need...    Turning from your back to your side while in flat bed without using bedrails? 3  -HP     Moving from lying on back to sitting on the side of a flat bed without bedrails? 3  -HP     Moving to and from a bed to a chair (including a wheelchair)? 3  -HP     Standing up from a chair using your arms (e.g., wheelchair, bedside chair)? 3  -HP     Climbing 3-5 steps with a railing? 2  -HP     To walk in hospital room? 3  -HP     AM-PAC 6 Clicks Score (PT) 17  -HP     Highest Level of Mobility Goal 5 --> Static standing  -       Row Name 12/06/23 1104          Functional Assessment    Outcome Measure Options AM-PAC 6 Clicks Basic Mobility (PT)  -               User Key  (r) = Recorded By, (t) = Taken By, (c) = Cosigned By      Initials Name Provider Type    Mervat López PT Physical Therapist                                  Physical Therapy Education       Title: PT OT SLP Therapies (Done)       Topic: Physical Therapy (Done)       Point: Mobility training (Done)       Learning Progress Summary             Patient Acceptance, E,D, VU,DU by  at 12/6/2023 1105                         Point: Home exercise program (Done)       Learning Progress Summary             Patient Acceptance, E,D, VU,DU by  at 12/6/2023 1105                         Point: Body mechanics (Done)       Learning Progress Summary             Patient Acceptance, E,D, VU,DU by  at 12/6/2023 1105                         Point: Precautions (Done)       Learning Progress Summary             Patient Acceptance, E,D, VU,DU by  at 12/6/2023 1105                                         User Key       Initials Effective Dates Name Provider Type Discipline     06/01/21 -  Mervat Lozano, PT Physical Therapist PT                  PT Recommendation and Plan  Planned Therapy Interventions (PT): balance training, bed mobility training, gait training, home exercise program, ROM (range of motion), patient/family education, stair training, strengthening, stretching, transfer training  Plan of Care Reviewed With: patient, spouse  Progress: no change  Outcome Evaluation: Pt navigated steps with Min A x2 and axillary crutches. Assistance for support and steadiness required. Good safety awareness noted and spouse present for training. Activity limited by fatigue and pain. Recommend d/c home with 24/7 assistance and HHPT when medically appropriate. Nausea present initially with movement and pain rated 8/10 post mobility.     Time Calculation:   PT Evaluation Complexity  History, PT Evaluation Complexity: 1-2 personal factors and/or comorbidities  Examination of Body Systems (PT Eval Complexity): total of 3 or more elements  Clinical Presentation (PT Evaluation Complexity): evolving  Clinical Decision Making (PT Evaluation Complexity): moderate complexity  Overall  Complexity (PT Evaluation Complexity): moderate complexity     PT Charges       Row Name 12/06/23 0944             Time Calculation    Start Time 0944  -HP      PT Received On 12/06/23  -HP      PT Goal Re-Cert Due Date 12/16/23  -HP         Timed Charges    61619 - Gait Training Minutes  20  -HP      99665 - PT Therapeutic Activity Minutes 5  -HP         Untimed Charges    PT Eval/Re-eval Minutes 50  -HP         Total Minutes    Timed Charges Total Minutes 25  -HP      Untimed Charges Total Minutes 50  -HP       Total Minutes 75  -HP                User Key  (r) = Recorded By, (t) = Taken By, (c) = Cosigned By      Initials Name Provider Type     Mervat Lozano, PT Physical Therapist                  Therapy Charges for Today       Code Description Service Date Service Provider Modifiers Qty    30267279068 HC GAIT TRAINING EA 15 MIN 12/6/2023 Mervat Lozano, PT GP 2    26891798195 HC PT EVAL MOD COMPLEXITY 4 12/6/2023 Mervat Lozano, PT GP 1    02989776286 HC PT THER SUPP EA 15 MIN 12/6/2023 Mervat Lozano, PT GP 3            PT G-Codes  Outcome Measure Options: AM-PAC 6 Clicks Basic Mobility (PT)  AM-PAC 6 Clicks Score (PT): 17  PT Discharge Summary  Anticipated Discharge Disposition (PT): home with home health, home with 24/7 care    Mervat Lozano PT  12/6/2023

## 2023-12-06 NOTE — PLAN OF CARE
Goal Outcome Evaluation:                        Problem: Adult Inpatient Plan of Care  Goal: Absence of Hospital-Acquired Illness or Injury  Intervention: Identify and Manage Fall Risk  Recent Flowsheet Documentation  Taken 12/6/2023 0400 by Teo Villalobos RN  Safety Promotion/Fall Prevention:   activity supervised   safety round/check completed   toileting scheduled  Taken 12/6/2023 0200 by Teo Villalobos RN  Safety Promotion/Fall Prevention:   activity supervised   safety round/check completed   toileting scheduled  Taken 12/6/2023 0000 by Teo Villalobos RN  Safety Promotion/Fall Prevention:   activity supervised   safety round/check completed   toileting scheduled  Taken 12/5/2023 2200 by Teo Villalobos RN  Safety Promotion/Fall Prevention:   activity supervised   safety round/check completed   toileting scheduled  Taken 12/5/2023 2000 by Teo Villalobos RN  Safety Promotion/Fall Prevention:   activity supervised   safety round/check completed   toileting scheduled  Intervention: Prevent Skin Injury  Recent Flowsheet Documentation  Taken 12/6/2023 0400 by Teo Villalobos RN  Body Position: supine  Skin Protection: adhesive use limited  Taken 12/6/2023 0200 by Teo Villalobos RN  Body Position: supine  Skin Protection: adhesive use limited  Taken 12/6/2023 0000 by Teo Villalobos RN  Body Position: supine  Skin Protection: adhesive use limited  Taken 12/5/2023 2200 by Teo Villalobos RN  Body Position: supine  Skin Protection: adhesive use limited  Taken 12/5/2023 2000 by Teo Villalobos RN  Body Position: supine  Skin Protection: adhesive use limited  Intervention: Prevent and Manage VTE (Venous Thromboembolism) Risk  Recent Flowsheet Documentation  Taken 12/6/2023 0400 by Teo Villalobos RN  VTE Prevention/Management:   right   sequential compression devices on  Taken 12/6/2023 0200 by Teo Villalobos RN  VTE Prevention/Management:   right   sequential compression devices on  Taken 12/6/2023 0000  by Teo Villalobos RN  VTE Prevention/Management:   right   sequential compression devices on  Taken 12/5/2023 2200 by Teo Villalobos RN  VTE Prevention/Management:   right   sequential compression devices on  Taken 12/5/2023 2000 by Teo Villalobos RN  VTE Prevention/Management:   right   sequential compression devices on  Intervention: Prevent Infection  Recent Flowsheet Documentation  Taken 12/6/2023 0400 by Teo Villalobos RN  Infection Prevention: environmental surveillance performed  Taken 12/6/2023 0200 by Teo Villalobos RN  Infection Prevention: environmental surveillance performed  Taken 12/6/2023 0000 by Teo Villalobos RN  Infection Prevention: environmental surveillance performed  Taken 12/5/2023 2200 by Teo Villalobos RN  Infection Prevention: environmental surveillance performed  Taken 12/5/2023 2000 by Teo Villalobos RN  Infection Prevention: environmental surveillance performed  Goal: Optimal Comfort and Wellbeing  Intervention: Monitor Pain and Promote Comfort  Recent Flowsheet Documentation  Taken 12/6/2023 0400 by Teo Villalobos RN  Pain Management Interventions: quiet environment facilitated  Taken 12/6/2023 0200 by Teo Villalobos RN  Pain Management Interventions: quiet environment facilitated  Taken 12/6/2023 0000 by Teo Villalobos RN  Pain Management Interventions: quiet environment facilitated  Taken 12/5/2023 2200 by Teo Villalobos RN  Pain Management Interventions: quiet environment facilitated  Taken 12/5/2023 2000 by Teo Villalobos RN  Pain Management Interventions: quiet environment facilitated  Intervention: Provide Person-Centered Care  Recent Flowsheet Documentation  Taken 12/6/2023 0400 by Teo Villalobos RN  Trust Relationship/Rapport: care explained  Taken 12/6/2023 0200 by Teo Villalobos RN  Trust Relationship/Rapport: care explained  Taken 12/6/2023 0000 by Teo Villalobos RN  Trust Relationship/Rapport: care explained  Taken 12/5/2023 2200 by Griselda  Teo MENON, RN  Trust Relationship/Rapport: care explained  Taken 12/5/2023 2000 by Teo Villalobos, RN  Trust Relationship/Rapport: care explained     VSSS, voids well, rested throughout the night, pain managed with PRN medications, will continue to monitor for changes.

## 2023-12-06 NOTE — PROGRESS NOTES
"          Orthopaedic Surgery Progress Note    LOS: 0 days   Patient Care Team:  Kiesha Berger PA as PCP - General (Family Medicine)  1 Day Post-Op   Procedure(s):  ORIF ankle syndesmotic fixation - LEFT  Subjective   Interval History:   Resting comfortably in bed, pain surgical site well-controlled however complaining of headache and nausea.  Had gotten previously Zofran which did help somewhat with nausea.  Unclear what pain medication was primarily because of nausea but seems like it was likely her IV Dilaudid.    Objective     Vital Signs:  Temp (24hrs), Av.2 °F (36.8 °C), Min:97.5 °F (36.4 °C), Max:99.2 °F (37.3 °C)    /79 (BP Location: Right arm, Patient Position: Lying)   Pulse 75   Temp 98.4 °F (36.9 °C) (Oral)   Resp 18   Ht 165.1 cm (65\")   Wt 90.3 kg (199 lb)   LMP  (LMP Unknown)   SpO2 97%   BMI 33.12 kg/m²   Body mass index is 33.12 kg/m².    Labs:  Lab Results (last 24 hours)       Procedure Component Value Units Date/Time    CBC (No Diff) [[id number]]  (Abnormal) Collected: 23    Specimen: Blood Updated: 23     WBC 12.93 10*3/mm3      RBC 3.76 10*6/mm3      Hemoglobin 11.9 g/dL      Hematocrit 34.7 %      MCV 92.3 fL      MCH 31.6 pg      MCHC 34.3 g/dL      RDW 12.2 %      RDW-SD 41.2 fl      MPV 11.0 fL      Platelets 196 10*3/mm3             Physical Exam:  left LE: splint CDI, compartments soft/compressible around splint   + Wiggling toes   SILT in toes   Palpable DP beneath splint, CR brisk      Assessment/Plan:  1 Day Post-Op status post:  Procedure(s):  ORIF ankle syndesmotic fixation - LEFT    -NWB operative extremity  -Advance diet as tolerated  -Keep splint/operative dressing clean and dry  -DVT prophylaxis, mechanical and ASA, home on ASA  -Postoperative antibiotics  -Pain control  -PT/OT  -Elevate operative extremity  -Consult medicine, appreciate recs  -Dispo, likely home today     Edgar De La Paz MD  23  12:33 EST       "

## 2023-12-27 ENCOUNTER — OFFICE VISIT (OUTPATIENT)
Dept: ORTHOPEDIC SURGERY | Facility: CLINIC | Age: 53
End: 2023-12-27
Payer: COMMERCIAL

## 2023-12-27 VITALS — TEMPERATURE: 97.1 F

## 2023-12-27 DIAGNOSIS — Z09 SURGERY FOLLOW-UP: ICD-10-CM

## 2023-12-27 DIAGNOSIS — S82.892A CLOSED FRACTURE OF LEFT ANKLE, INITIAL ENCOUNTER: Primary | ICD-10-CM

## 2023-12-27 PROCEDURE — 99024 POSTOP FOLLOW-UP VISIT: CPT | Performed by: ORTHOPAEDIC SURGERY

## 2023-12-27 NOTE — PROGRESS NOTES
[facility] [place] Orthopaedic Surgery Office Follow Up     Office Follow Up Visit     Date: 12/27/2023   Patient Name: Lluvia Kumar  MRN: [id number]  YOB: 1970  Chief Complaint:   Chief Complaint   Patient presents with    Post-op     3 weeks status post left ORIF ankle syndesmotic fixation (DOS 12/5/23)        History of Present Illness:   Lluvia Kumar is a 53 y.o. female who is here today for follow up for left ankle status post ORIF on December 5.  Has been nonweightbearing in splint.  Pain well-controlled.  No new complaints.    Subjective   I reviewed the patient's chief complaint, history of present illness, review of systems, past medical history, surgical history, family history, social history, medications and allergy list   Objective    Vital Signs:   Vitals:    12/27/23 1322   Temp: 97.1 °F (36.2 °C)     There is no height or weight on file to calculate BMI.    Ortho Exam:  left LE Foot and Ankle Exam:   Splint taken down for exam.  Leg compartments soft and compressible.  Incisions clean dry and intact medially and laterally, sutures in place, no drainage or erythema.  Able to actively plantarflex and dorsiflex ankle and all toes.  Appropriate swelling for this stage of healing about the foot and ankle.  Toes warm and well-perfused.  Brisk cap refill in all toes.     Results Review:  XR Ankle 3+ View Left  Left Ankle X-Ray 12/27/23   Indication: Pain  Views: 3 simulated weight bearing , comparison to previous  Findings: xrays reviewed by me today in the office and show, hardware in   place with proper alignment, no signs of hardware failure, ankle mortise   well-maintained        Assessment / Plan    Assessment/Plan:   Diagnoses and all orders for this visit:    1. Closed fracture of left ankle, initial encounter (Primary)    2. Surgery follow-up  -     XR Ankle 3+ View Left      Patient is now 2 weeks postop., doing well. Happy with result, no complaints. Placed in  short leg fiberglass cast in clinic today. Sutures removed in clinic today.  Steri-Strips placed.  Continue to elevate during recovery.  Patient is to be nonweightbearing with use of assistive devices.  Continue aspirin. Plan to see patient back in clinic in 5 weeks for repeat x-rays and reevaluation.  Patient counseled to contact the clinic if anything should arise in the meantime.     Follow Up:   Return in about 5 weeks (around 1/31/2024) for Recheck, F/U with Images.      Edgar De La Paz MD  [facility] [place] Orthopedic Sur[facility]   Answers submitted by the patient for this visit:  Primary Reason for Visit (Submitted on 12/26/2023)  What is the primary reason for your visit?: Lower Extremity Injury  Lower Extremity Injury Questionnaire (Submitted on 12/26/2023)  Chief Complaint: Lower extremity pain  Incident occurred: more than 1 week ago  Incident location: at home  Injury mechanism: a fall  Pain location: left leg  Pain course: intermittent  tingling: Yes  inability to bear weight: Yes  loss of motion: Yes  loss of sensation: No  muscle weakness: No

## 2024-01-22 ENCOUNTER — OFFICE VISIT (OUTPATIENT)
Dept: ORTHOPEDIC SURGERY | Facility: CLINIC | Age: 54
End: 2024-01-22
Payer: COMMERCIAL

## 2024-01-22 DIAGNOSIS — Z09 SURGERY FOLLOW-UP: Primary | ICD-10-CM

## 2024-01-22 DIAGNOSIS — Z98.890 S/P ORIF (OPEN REDUCTION INTERNAL FIXATION) FRACTURE: ICD-10-CM

## 2024-01-22 DIAGNOSIS — Z87.81 S/P ORIF (OPEN REDUCTION INTERNAL FIXATION) FRACTURE: ICD-10-CM

## 2024-01-22 PROCEDURE — 99024 POSTOP FOLLOW-UP VISIT: CPT | Performed by: ORTHOPAEDIC SURGERY

## 2024-01-22 RX ORDER — ESTRADIOL 0.05 MG/D
1 PATCH TRANSDERMAL WEEKLY
COMMUNITY
Start: 2023-12-29

## 2024-01-22 NOTE — PROGRESS NOTES
[facility] Office Follow Up     Office Follow Up Visit     Date: 01/22/2024   Patient Name: Lluvia Kumar  MRN: [id number]  YOB: 1970  Chief Complaint:   Chief Complaint   Patient presents with    Post-op     4 week follow up -- 7 weeks status post left ORIF ankle syndesmotic fixation (DOS 12/5/23)      History of Present Illness:   Lluvai Kumar is a 53 y.o. female who is here today for follow up for status post left ankle ORIF on December.  Has been on weightbearing in cast.  States over the last week is felt increased pressure in her foot.  Was scheduled to come to clinic next week for follow-up however came in today due to the new pressure.  States pressure has been causing some discomfort.  Describes any focal pain or new trauma.    Subjective   I reviewed the patient's chief complaint, history of present illness, review of systems, past medical history, surgical history, family history, social history, medications and allergy list   Objective    Vital Signs: There were no vitals filed for this visit.  There is no height or weight on file to calculate BMI.    Ortho Exam:  left LE Foot and Ankle Exam:   Splint taken down for exam.  Leg compartments soft and compressible.  Incisions healing well, no drainage or erythema.  Able to actively plantarflex and dorsiflex ankle and all toes.  Appropriate swelling for this stage of healing about the foot and ankle.  Foot still has a fair amount of swelling.  Toes warm and well-perfused.  Brisk cap refill in all toes.    Results Review:  XR Ankle 3+ View Left  Left Ankle X-Ray 01/22/24   Indication: Pain  Views: 3 simulated weight bearing , comparison to previous  Findings: xrays reviewed by me today in the office and show, hardware in   place with proper alignment, no signs of hardware failure, ankle mortise   well-maintained        Assessment / Plan    Assessment/Plan:   Diagnoses and all orders for this  visit:    1. Surgery follow-up (Primary)  -     XR Ankle 3+ View Left    2. S/P ORIF (open reduction internal fixation) fracture, left ankle  -     Ambulatory Referral to Physical Therapy Evaluate and treat      Patient is now 6 weeks postop., doing well. Happy with result, no complaints. Placed in tall CAM boot in clinic today. Continue to ice and elevate during recovery.  Patient is to be nonweightbearing in tall cam walking boot for the next couple of weeks.  In 2 weeks she can start weightbearing as tolerated in boot as well as start physical therapy.  Prescription provided for physical therapy today. Continue ASA for DVT ppx while nonweightbearing. Plan to see patient back in clinic in 6 weeks for repeat x-rays and reevaluation.  Patient counseled to contact the clinic if anything should arise in the meantime.     Follow Up:   Return in about 6 weeks (around 3/4/2024) for Recheck, F/U with Images.      Edgar De La Paz MD  [facility]   Answers submitted by the patient for this visit:  Other (Submitted on 1/22/2024)  Please describe your symptoms.: Leg pain  Have you had these symptoms before?: Yes  How long have you been having these symptoms?: 1-4 days  Please list any medications you are currently taking for this condition.: Tylenol  Please describe any probable cause for these symptoms. : Cast  Primary Reason for Visit (Submitted on 1/22/2024)  What is the primary reason for your visit?: Other

## 2024-02-09 ENCOUNTER — TREATMENT (OUTPATIENT)
Dept: PHYSICAL THERAPY | Facility: CLINIC | Age: 54
End: 2024-02-09
Payer: COMMERCIAL

## 2024-02-09 DIAGNOSIS — G89.29 CHRONIC PAIN OF LEFT ANKLE: Primary | ICD-10-CM

## 2024-02-09 DIAGNOSIS — M25.572 CHRONIC PAIN OF LEFT ANKLE: Primary | ICD-10-CM

## 2024-02-09 PROCEDURE — 97161 PT EVAL LOW COMPLEX 20 MIN: CPT | Performed by: PHYSICAL THERAPIST

## 2024-02-09 PROCEDURE — 97110 THERAPEUTIC EXERCISES: CPT | Performed by: PHYSICAL THERAPIST

## 2024-02-15 ENCOUNTER — TREATMENT (OUTPATIENT)
Dept: PHYSICAL THERAPY | Facility: CLINIC | Age: 54
End: 2024-02-15
Payer: COMMERCIAL

## 2024-02-15 DIAGNOSIS — G89.29 CHRONIC PAIN OF LEFT ANKLE: Primary | ICD-10-CM

## 2024-02-15 DIAGNOSIS — M25.572 CHRONIC PAIN OF LEFT ANKLE: Primary | ICD-10-CM

## 2024-02-15 PROCEDURE — 97110 THERAPEUTIC EXERCISES: CPT | Performed by: PHYSICAL THERAPIST

## 2024-02-15 PROCEDURE — 97140 MANUAL THERAPY 1/> REGIONS: CPT | Performed by: PHYSICAL THERAPIST

## 2024-02-19 ENCOUNTER — TREATMENT (OUTPATIENT)
Dept: PHYSICAL THERAPY | Facility: CLINIC | Age: 54
End: 2024-02-19
Payer: COMMERCIAL

## 2024-02-19 DIAGNOSIS — G89.29 CHRONIC PAIN OF LEFT ANKLE: Primary | ICD-10-CM

## 2024-02-19 DIAGNOSIS — M25.572 CHRONIC PAIN OF LEFT ANKLE: Primary | ICD-10-CM

## 2024-02-19 PROCEDURE — 97110 THERAPEUTIC EXERCISES: CPT | Performed by: PHYSICAL THERAPIST

## 2024-02-19 PROCEDURE — 97140 MANUAL THERAPY 1/> REGIONS: CPT | Performed by: PHYSICAL THERAPIST

## 2024-02-21 ENCOUNTER — TREATMENT (OUTPATIENT)
Dept: PHYSICAL THERAPY | Facility: CLINIC | Age: 54
End: 2024-02-21
Payer: COMMERCIAL

## 2024-02-21 DIAGNOSIS — G89.29 CHRONIC PAIN OF LEFT ANKLE: Primary | ICD-10-CM

## 2024-02-21 DIAGNOSIS — M25.572 CHRONIC PAIN OF LEFT ANKLE: Primary | ICD-10-CM

## 2024-02-21 PROCEDURE — 97110 THERAPEUTIC EXERCISES: CPT | Performed by: PHYSICAL THERAPIST

## 2024-02-21 PROCEDURE — 97112 NEUROMUSCULAR REEDUCATION: CPT | Performed by: PHYSICAL THERAPIST

## 2024-02-21 PROCEDURE — 97140 MANUAL THERAPY 1/> REGIONS: CPT | Performed by: PHYSICAL THERAPIST

## 2024-02-21 NOTE — PROGRESS NOTES
Physical Therapy Daily Treatment Note    [facility]   [address], [address] [place] [address]      Patient: Lluvia Kumar   : 1970  Referring practitioner: Edgar De La Paz MD  Date of Initial Visit: Type: THERAPY  Noted: 2024  Today's Date: 2024  Patient seen for 2 sessions    Certification Period 2024 thru 2024       Visit Diagnoses:    ICD-10-CM ICD-9-CM   1. Chronic pain of left ankle  M25.572 719.47    G89.29 338.29       Subjective     Patient states he has been working on exercises at home and she feels comfortable with doing them and has not had an increase and pain in the ankle since beginning exercise.  She still does not feel that she is getting her ankle dorsiflex to neutral and she has been compliant with walking in her home using the boot.    Objective   See Exercise, Manual, and Modality Logs for complete treatment.       Assessment/Plan     Patient responded well to treatment and did not experience exacerbation of symptoms in the left ankle or foot.  Hypertonicity noted in the posterior lower leg musculature with tightness at the Achilles tendon as well.  Patient was able to perform activity in the clinic and showed an improvement in her ankle active range of motion post manual therapy.  Will continue to progress activity as indicated and will work to wean out of the boot over the next several weeks.      Timed:         Manual Therapy:    26     mins  29217;     Therapeutic Exercise:    30     mins  16140;     Neuromuscular Tez:        mins  80748;    Therapeutic Activity:          mins  06771;     Gait Training:           mins  83171;     Ultrasound:          mins  81556;    Ionto                                   mins   16724  Self Care                            mins   79739  Canalith Repos         mins 50586  Electrical Stimulation:         mins  01488    Un-Timed:  Electrical Stimulation:         mins  28160 ( );  Dry Needling          mins  self-pay  Traction          mins 34316      Timed Treatment:   56   mins   Total Treatment:     56   mins    Jesus Roland PT, DPT, OCS, Cert. DN  [place] License: [id number]

## 2024-02-21 NOTE — PROGRESS NOTES
Physical Therapy Daily Treatment Note    [facility]   [address], [address] [place] [address]      Patient: Lluvia Kumar   : 1970  Referring practitioner: Edgar De La Paz MD  Date of Initial Visit: Type: THERAPY  Noted: 2024  Today's Date: 2024  Patient seen for 2 sessions    Certification Period 2024 thru 2024       Visit Diagnoses:    ICD-10-CM ICD-9-CM   1. Chronic pain of left ankle  M25.572 719.47    G89.29 338.29       Subjective     Patient states that she feels like she is still very limited in her ankle range of motion and she has been able to place her foot completely flat due to the heel coming up because of the tightness in the ankle and the calf.  Patient has been working on exercise at home and has been able to do so without any significant exacerbation of symptoms.  She has spent time standing for prolonged periods without any significant increase in the foot or ankle as well.  Patient has continued to use the boot and crutches for ambulation.    Objective   See Exercise, Manual, and Modality Logs for complete treatment.       Assessment/Plan     Patient continues to have limitation of the talocrural joint but has responded well to manual therapy and demonstrated improvement in her ankle active range of motion at the conclusion of treatment today.  Progressing activity in the clinic as indicated and will begin to wean out of the boot when appropriate.      Timed:         Manual Therapy:    24     mins  41647;     Therapeutic Exercise:    30     mins  57485;     Neuromuscular Tez:        mins  27819;    Therapeutic Activity:          mins  51989;     Gait Training:           mins  26914;     Ultrasound:          mins  10134;    Ionto                                   mins   42450  Self Care                            mins   58461  Canalith Repos         mins 60332  Electrical Stimulation:         mins  45283    Un-Timed:  Electrical Stimulation:         mins   06882 ( );  Dry Needling          mins self-pay  Traction          mins 02247      Timed Treatment:   54   mins   Total Treatment:     54   mins    Jesus Roland, PT, DPT, OCS, Cert. DN  [place] License: [id number]

## 2024-02-21 NOTE — PROGRESS NOTES
Physical Therapy Daily Treatment Note                [address] [place] [address]       Patient: Lluvia Kumar   : 1970  Diagnosis/ICD-10 Code:  Chronic pain of left ankle [M25.572, G89.29]  Referring practitioner: Edgar De La Paz MD  Date of Initial Visit: Type: THERAPY  Noted: 2024  Today's Date: 2024  Patient seen for 3 sessions             Subjective   Lluvia Kumar reports: The stiffness is the most limiting part right now to get out of the boot. Started the new exercises and going well.     Objective   AROM DF Left Ankle: -14 deg  PROM DF Left Ankle: -10 deg  See Exercise, Manual, and Modality Logs for complete treatment.       Assessment/Plan  Challenged patient more in sitting and standing positions. Did have a lot of sensitivity in the heel pad with standing but improved with folded towel under left heel for some cushion and small lift. She as able to complete sit to stand with 50/50 weight bear using the towel under the left heel, otherwise she would shift significantly to the right to get off the left side. Some improvements in ankle ROM since IE.   Progress per Plan of Care and Progress strengthening /stabilization /functional activity           Timed:  Manual Therapy:    24     mins  01696;  Therapeutic Exercise:    20     mins  38901;     Neuromuscular Tez:    12    mins  62999;    Therapeutic Activity:          mins  18498;     Gait Training:           mins  14568;     Ultrasound:          mins  78658;    Electrical Stimulation:         mins  42097;  Iontophoresis          mins  58075    Untimed:  Electrical Stimulation:         mins  70891 ( );  Mechanical Traction:         mins  92733;   Fluidotherapy          mins  59124    Timed Treatment:   56   mins   Total Treatment:     56   mins        Suzanne Tejeda PTA  Physical Therapist Assistant

## 2024-02-26 ENCOUNTER — TREATMENT (OUTPATIENT)
Dept: PHYSICAL THERAPY | Facility: CLINIC | Age: 54
End: 2024-02-26
Payer: COMMERCIAL

## 2024-02-26 DIAGNOSIS — M25.572 CHRONIC PAIN OF LEFT ANKLE: Primary | ICD-10-CM

## 2024-02-26 DIAGNOSIS — G89.29 CHRONIC PAIN OF LEFT ANKLE: Primary | ICD-10-CM

## 2024-02-26 NOTE — PROGRESS NOTES
Physical Therapy Daily Treatment Note                [address] [place] [address]       Patient: Lluvia Kumar   : 1970  Diagnosis/ICD-10 Code:  Chronic pain of left ankle [M25.572, G89.29]  Referring practitioner: Edgar De La Paz MD  Date of Initial Visit: Type: THERAPY  Noted: 2024  Today's Date: 2024  Patient seen for 5 sessions             Subjective   Lluvia Kumar reports: getting out of the boot more around the house.    Objective   L Ankle DF (KE)  AROM: -6 deg  PROM: -4 deg    See Exercise, Manual, and Modality Logs for complete treatment.       Assessment/Plan  Significant increase in ankle ROM compared to last visit. Noted she is able to tolerate standing with heel resting on ground with minimal discomfort. Will continue to work towards normalizing ankle ROM to allow her to walk out of boot with crutches for AD within the next week.   Progress per Plan of Care and Progress strengthening /stabilization /functional activity           Timed:  Manual Therapy:    23     mins  69338;  Therapeutic Exercise:    16     mins  81264;     Neuromuscular Tez:    15    mins  32273;    Therapeutic Activity:          mins  86199;     Gait Training:           mins  20065;     Ultrasound:          mins  77246;    Electrical Stimulation:         mins  33545;  Iontophoresis          mins  02335    Untimed:  Electrical Stimulation:         mins  92812 ( );  Mechanical Traction:         mins  04767;   Fluidotherapy          mins  83410    Timed Treatment:   54   mins   Total Treatment:     54   mins        Suzanne Tejeda PTA  Physical Therapist Assistant

## 2024-02-28 ENCOUNTER — TREATMENT (OUTPATIENT)
Dept: PHYSICAL THERAPY | Facility: CLINIC | Age: 54
End: 2024-02-28
Payer: COMMERCIAL

## 2024-02-28 DIAGNOSIS — G89.29 CHRONIC PAIN OF LEFT ANKLE: Primary | ICD-10-CM

## 2024-02-28 DIAGNOSIS — M25.572 CHRONIC PAIN OF LEFT ANKLE: Primary | ICD-10-CM

## 2024-02-28 PROCEDURE — 97530 THERAPEUTIC ACTIVITIES: CPT | Performed by: PHYSICAL THERAPIST

## 2024-02-28 PROCEDURE — 97110 THERAPEUTIC EXERCISES: CPT | Performed by: PHYSICAL THERAPIST

## 2024-02-29 NOTE — PROGRESS NOTES
Physical Therapy Daily Treatment Note    [facility]   [address], [address] [place] [address]      Patient: Lluvia Kumar   : 1970  Referring practitioner: Edgar De La Paz MD  Date of Initial Visit: Type: THERAPY  Noted: 2024  Today's Date: 2024  Patient seen for 6 sessions    Certification Period 2024 thru 2024       Visit Diagnoses:    ICD-10-CM ICD-9-CM   1. Chronic pain of left ankle  M25.572 719.47    G89.29 338.29       Subjective     Pt states that she is feeling improvement each week and she has been walking around her house more without the boot. She has also been working on walking with one crutch while using the boot as well. Pt reports compliance with her HEP    Objective   See Exercise, Manual, and Modality Logs for complete treatment.       Assessment/Plan     Pt is progressing well and she was able to perform intermittent standing activity in the clinic without the boot today. Will plan on seeing her for another session this week and focus more on manual intervention for improved mobiity of the ankle.       Timed:         Manual Therapy:         mins  79181;     Therapeutic Exercise:    13     mins  67176;     Neuromuscular Tez:        mins  43471;    Therapeutic Activity:     30     mins  47841;     Gait Training:           mins  59914;     Ultrasound:          mins  45122;    Ionto                                   mins   11295  Self Care                            mins   37375  Canalith Repos         mins 55562  Electrical Stimulation:         mins  14238    Un-Timed:  Electrical Stimulation:         mins  13828 ( );  Dry Needling          mins self-pay  Traction          mins 84702      Timed Treatment:   43   mins   Total Treatment:     43   mins    Jesus Roland, PT, DPT, OCS, Cert. DN  [place] License: [id number]

## 2024-03-01 ENCOUNTER — TREATMENT (OUTPATIENT)
Dept: PHYSICAL THERAPY | Facility: CLINIC | Age: 54
End: 2024-03-01
Payer: COMMERCIAL

## 2024-03-01 DIAGNOSIS — G89.29 CHRONIC PAIN OF LEFT ANKLE: Primary | ICD-10-CM

## 2024-03-01 DIAGNOSIS — M25.572 CHRONIC PAIN OF LEFT ANKLE: Primary | ICD-10-CM

## 2024-03-01 PROCEDURE — 97110 THERAPEUTIC EXERCISES: CPT | Performed by: PHYSICAL THERAPIST

## 2024-03-01 PROCEDURE — 97140 MANUAL THERAPY 1/> REGIONS: CPT | Performed by: PHYSICAL THERAPIST

## 2024-03-01 NOTE — PROGRESS NOTES
Physical Therapy Re Certification Of Plan of Care    [facility]   [address], [address] [place] [address]    Patient: Lluvia Kumar   : 1970  Diagnosis/ICD-10 Code:  Chronic pain of left ankle [M25.572, G89.29]  Referring practitioner: Edgar De La Paz MD  Date of Initial Visit: Type: THERAPY  Noted: 2024  Today's Date: 3/1/2024  Patient seen for 7 sessions         Visit Diagnoses:    ICD-10-CM ICD-9-CM   1. Chronic pain of left ankle  M25.572 719.47    G89.29 338.29         Lluvia Kumar reports that she is feeling some improvement in her ankle and after sessions she feels that her motion is improved and generally has a decrease in the stiff/tight feeling in her ankle. She has been walking at home without the boot and using two cructhes and also practicing walking with the boot and only one crutch. She has been able to progress her ambulation without experiencing any significant exacerbation of pain or excessive fatigue. She is still having trouble with getting her motion back, but she is understanding that this is a gradual process    Clinical Progress: improved  Home Program Compliance: Yes  Treatment has included: therapeutic exercise, neuromuscular re-education, manual therapy, and therapeutic activity      Subjective       Objective          Active Range of Motion   Left Ankle/Foot   Plantar flexion: 48 degrees   Inversion: 40 degrees   Eversion: 3 degrees     Additional Active Range of Motion Details  Left ankle DF = -8    Strength/Myotome Testing     Left Ankle/Foot   Dorsiflexion: 4+  Plantar flexion: 4+  Inversion: 4  Eversion: 4-    Ambulation     Ambulation: Level Surfaces     Additional Level Surfaces Ambulation Details  Pt able to walk in the clinic using bilateral axillary crutches and without the use of a tall boot. After stretching she can perform a heel strike but most of the time she is making intial contact using the forefoot.           Assessment/Plan    Pt is making  steady progress with therapy and she demonstrates an improvement in her left ankle ROM, although she does continue to be limited with ROM and is unable to dorsiflex to neutral. She is progressing out of the boot more and has been educated on proper, safe technique for walking with one crutch. Pt will cont to benefit from skilled PT services and we will progress as indicated.      Recommendations: Continue as planned  Timeframe: 2 months  Prognosis to achieve goals: good      Timed:         Manual Therapy:    32     mins  50330;     Therapeutic Exercise:    24     mins  17898;     Neuromuscular Tez:        mins  20708;    Therapeutic Activity:          mins  85094;     Gait Training:           mins  30919;     Ultrasound:          mins  29172;    Ionto                                   mins   52741  Self Care                            mins   81684  Canalith Repos         mins 81629      Un-Timed:  Electrical Stimulation:         mins  98553 ( );  Dry Needling          mins self-pay  Traction          mins 81818  Re-Eval                               mins  28525      Timed Treatment:   56   mins   Total Treatment:     56   mins          PT: Jesus Roland PT, DPT, OCS, Cert. DN   [place] License:  [id number]    Electronically signed by Jesus Roland PT, 03/01/24, 1:13 PM EST    Certification Period: 3/1/2024 thru 5/29/2024  I certify that the therapy services are furnished while this patient is under my care.  The services outlined above are required by this patient, and will be reviewed every 90 days.         Physician Signature:__________________________________________________    PHYSICIAN: Edgar De La Paz MD  NPI: [id number]                                      DATE:  :     Please sign and return via fax to [email or web address] . Thank you, [facility]

## 2024-03-04 ENCOUNTER — OFFICE VISIT (OUTPATIENT)
Dept: ORTHOPEDIC SURGERY | Facility: CLINIC | Age: 54
End: 2024-03-04
Payer: COMMERCIAL

## 2024-03-04 DIAGNOSIS — Z87.81 S/P ORIF (OPEN REDUCTION INTERNAL FIXATION) FRACTURE: Primary | ICD-10-CM

## 2024-03-04 DIAGNOSIS — Z98.890 S/P ORIF (OPEN REDUCTION INTERNAL FIXATION) FRACTURE: Primary | ICD-10-CM

## 2024-03-04 PROCEDURE — 99213 OFFICE O/P EST LOW 20 MIN: CPT | Performed by: ORTHOPAEDIC SURGERY

## 2024-03-04 NOTE — PROGRESS NOTES
[facility] Office Follow Up     Office Follow Up Visit     Date: 03/04/2024   Patient Name: Lluvia Kumar  MRN: [id number]  YOB: 1970  Chief Complaint:   Chief Complaint   Patient presents with    Post-op     6 week follow up- 3 months status post left ORIF ankle syndesmotic fixation (DOS 12/5/23)      History of Present Illness:   Lluvia Kumar is a 53 y.o. female who is here today for follow up for follow-up status post left ankle ORIF December 5.  Has been weightbearing as tolerated in a tall cam walking boot with the use of crutches.  Denies pain about the ankle however did states does have a lot of tightness.  Tightness is primarily on the medial side of the ankle.  Has been doing physical therapy working on range of motion.    Subjective   I reviewed the patient's chief complaint, history of present illness, review of systems, past medical history, surgical history, family history, social history, medications and allergy list   Objective    Vital Signs: There were no vitals filed for this visit.  There is no height or weight on file to calculate BMI.    Ortho Exam:  left LE Foot and Ankle Exam:   Boot removed for exam.  Leg compartments soft and compressible.  Incisions healing well, no drainage or erythema.  Able to actively plantarflex and dorsiflex ankle and all toes.  Appropriate swelling for this stage of healing about ankle.  Toes warm and well-perfused.  Brisk cap refill in all toes.  Decreased ankle motion, dorsiflexion to about 10 degrees shy of neutral plantarflexion to about 35 degrees.  Dorsiflexion does not improve with bending ipsilateral knee.       Results Review:  XR Ankle 3+ View Left  Left Ankle X-Ray 03/04/24   Indication: Pain  Views: 3 simulated weight bearing , comparison previous  Findings: xrays reviewed by me today in the office and show, hardware in   place with proper alignment, no signs of hardware failure, ankle  low   well-maintained        Assessment / Plan    Assessment/Plan:   Diagnoses and all orders for this visit:    1. S/P ORIF (open reduction internal fixation) fracture, left ankle (Primary)  -     XR Ankle 3+ View Left  -     Ambulatory Referral to Physical Therapy Evaluate and treat      Patient is now approximately 12 weeks postop, doing well.  She does have decreased ankle motion however is only been doing physical therapy for about 3 weeks.  She is stuck in slight equinus and feels like she has a long way to go with regard to improving her motion but I am optimistic that with therapy she can get there.  Patient can now wean out of tall cam walking boot and into a normal shoe and she can wean off of crutches as able.  Provided patient with a new prescription for physical therapy.  Will plan to see patient back in 2 months for repeat x-rays and reevaluation.    Follow Up:   Return in about 2 months (around 5/4/2024).      Edgar De La Paz MD  [facility]   Answers submitted by the patient for this visit:  Other (Submitted on 2/27/2024)  Please describe your symptoms.: Xray  Have you had these symptoms before?: Yes  How long have you been having these symptoms?: Greater than 2 weeks  Primary Reason for Visit (Submitted on 2/27/2024)  What is the primary reason for your visit?: Other

## 2024-03-06 ENCOUNTER — TREATMENT (OUTPATIENT)
Dept: PHYSICAL THERAPY | Facility: CLINIC | Age: 54
End: 2024-03-06
Payer: COMMERCIAL

## 2024-03-06 DIAGNOSIS — G89.29 CHRONIC PAIN OF LEFT ANKLE: Primary | ICD-10-CM

## 2024-03-06 DIAGNOSIS — M25.572 CHRONIC PAIN OF LEFT ANKLE: Primary | ICD-10-CM

## 2024-03-06 PROCEDURE — 97140 MANUAL THERAPY 1/> REGIONS: CPT | Performed by: PHYSICAL THERAPIST

## 2024-03-06 PROCEDURE — 97112 NEUROMUSCULAR REEDUCATION: CPT | Performed by: PHYSICAL THERAPIST

## 2024-03-06 PROCEDURE — 97110 THERAPEUTIC EXERCISES: CPT | Performed by: PHYSICAL THERAPIST

## 2024-03-13 ENCOUNTER — TREATMENT (OUTPATIENT)
Dept: PHYSICAL THERAPY | Facility: CLINIC | Age: 54
End: 2024-03-13
Payer: COMMERCIAL

## 2024-03-13 DIAGNOSIS — G89.29 CHRONIC PAIN OF LEFT ANKLE: Primary | ICD-10-CM

## 2024-03-13 DIAGNOSIS — M25.572 CHRONIC PAIN OF LEFT ANKLE: Primary | ICD-10-CM

## 2024-03-13 NOTE — PROGRESS NOTES
Physical Therapy Daily Treatment Note    [facility]   [address], [address] [place] [address]      Patient: Lluvia Kumar   : 1970  Referring practitioner: Edgar De La Paz MD  Date of Initial Visit: Type: THERAPY  Noted: 2024  Today's Date: 3/13/2024  Patient seen for 8 sessions    Certification Period 3/13/2024 thru 6/10/2024       Visit Diagnoses:    ICD-10-CM ICD-9-CM   1. Chronic pain of left ankle  M25.572 719.47    G89.29 338.29       Subjective     Pt states that she has been walking with crutches and without the use of a boot at home. She feels like she is doing well and that her motion has been improved since she has been out of the boot.     Objective   See Exercise, Manual, and Modality Logs for complete treatment.       Assessment/Plan     Focused heavily on STM to the left gastroc/soleus today in the clinic and performed manual stretching and mobilizations for the left ankle as well. Pt demonstrated an improvement in her left ankle mobility and we were able to initiate more standing activity today as well. Will cont to progress as indicated.       Timed:         Manual Therapy:    25     mins  35404;     Therapeutic Exercise:    15     mins  48564;     Neuromuscular Tez:    15    mins  47785;    Therapeutic Activity:          mins  50592;     Gait Training:           mins  30933;     Ultrasound:          mins  70440;    Ionto                                   mins   19082  Self Care                            mins   24759  Canalith Repos         mins 27952  Electrical Stimulation:         mins  86185    Un-Timed:  Electrical Stimulation:         mins  82124 (MC );  Dry Needling          mins self-pay  Traction          mins 86205      Timed Treatment:   55   mins   Total Treatment:     55   mins    Jesus Roland, PT, DPT, OCS, Cert. DN  [place] License: [id number]

## 2024-03-15 ENCOUNTER — TREATMENT (OUTPATIENT)
Dept: PHYSICAL THERAPY | Facility: CLINIC | Age: 54
End: 2024-03-15
Payer: COMMERCIAL

## 2024-03-15 DIAGNOSIS — G89.29 CHRONIC PAIN OF LEFT ANKLE: Primary | ICD-10-CM

## 2024-03-15 DIAGNOSIS — M25.572 CHRONIC PAIN OF LEFT ANKLE: Primary | ICD-10-CM

## 2024-03-15 PROCEDURE — 97110 THERAPEUTIC EXERCISES: CPT | Performed by: PHYSICAL THERAPIST

## 2024-03-15 PROCEDURE — 97140 MANUAL THERAPY 1/> REGIONS: CPT | Performed by: PHYSICAL THERAPIST

## 2024-03-15 PROCEDURE — 97112 NEUROMUSCULAR REEDUCATION: CPT | Performed by: PHYSICAL THERAPIST

## 2024-03-19 NOTE — PROGRESS NOTES
Physical Therapy Daily Treatment Note    [facility]   [address], [address] [place] [address]      Patient: Lluvia Kumar   : 1970  Referring practitioner: Edgar De La Paz MD  Date of Initial Visit: Type: THERAPY  Noted: 2024  Today's Date: 3/19/2024  Patient seen for 9 sessions    Certification Period 3/19/2024 thru 2024       Visit Diagnoses:    ICD-10-CM ICD-9-CM   1. Chronic pain of left ankle  M25.572 719.47    G89.29 338.29       Subjective     Pt states that she is feeling improved over the past couple of weeks and she feels that her ankle has improved significantly since beginning to walk without the boot.     Objective   See Exercise, Manual, and Modality Logs for complete treatment.       Assessment/Plan     Pt is making steady progress with therapy and she demonstrates an improvement in her overall mobility and tolerance to exercise in the clinic. Will cont to progress as indicated.       Timed:         Manual Therapy:    18     mins  01006;     Therapeutic Exercise:    13     mins  51971;     Neuromuscular Tez:    15    mins  42514;    Therapeutic Activity:     10     mins  80794;     Gait Training:           mins  50050;     Ultrasound:          mins  47196;    Ionto                                   mins   58398  Self Care                            mins   99808  Canalith Repos         mins 09643  Electrical Stimulation:         mins  90869    Un-Timed:  Electrical Stimulation:         mins  52740 ( );  Dry Needling          mins self-pay  Traction          mins 60391      Timed Treatment:   56   mins   Total Treatment:     56   mins    Jesus Roland, PT, DPT, OCS, Cert. DN  [place] License: [id number]

## 2024-03-20 ENCOUNTER — TREATMENT (OUTPATIENT)
Dept: PHYSICAL THERAPY | Facility: CLINIC | Age: 54
End: 2024-03-20
Payer: COMMERCIAL

## 2024-03-20 DIAGNOSIS — G89.29 CHRONIC PAIN OF LEFT ANKLE: Primary | ICD-10-CM

## 2024-03-20 DIAGNOSIS — M25.572 CHRONIC PAIN OF LEFT ANKLE: Primary | ICD-10-CM

## 2024-03-20 NOTE — PROGRESS NOTES
Physical Therapy Daily Treatment Note                [address] [facility] [address] [place] [address]       Patient: Lluvia Kumar   : 1970  Diagnosis/ICD-10 Code:  Chronic pain of left ankle [M25.572, G89.29]  Referring practitioner: Edgar DeL a Paz MD  Date of Initial Visit: Type: THERAPY  Noted: 2024  Today's Date: 3/20/2024  Patient seen for 10 sessions             Subjective   Lluvia Kumar reports: using only one crutch now to walk. Still feel very stiff when trying to raise toes off the ground. Getting more pops in the ankle that feel good.     Objective   See Exercise, Manual, and Modality Logs for complete treatment.       Assessment/Plan  Able to increase TC joint slightly with TC mobs. Still limited in ankle DF and eversion the most. Started TB ankle 4 way with good results and minimal leg compensations. Pt was able to stand on SL left side foot flat with handheld assist. She did need cues to improve glute activation to decrease hip drop.   Progress per Plan of Care and Progress strengthening /stabilization /functional activity           Timed:  Manual Therapy:    14     mins  27306;  Therapeutic Exercise:    13     mins  21827;     Neuromuscular Tez:    15    mins  63887;    Therapeutic Activity:     10     mins  54127;     Gait Training:           mins  97680;     Ultrasound:          mins  85265;    Electrical Stimulation:         mins  65326;  Iontophoresis          mins  32933    Untimed:  Electrical Stimulation:         mins  16231 ( );  Mechanical Traction:         mins  89604;   Fluidotherapy          mins  44426    Timed Treatment:   52   mins   Total Treatment:     52   mins        Suzanne Tejeda PTA  Physical Therapist Assistant

## 2024-03-21 NOTE — PROGRESS NOTES
Physical Therapy Daily Treatment Note    [facility]   [address] [facility], [address] [place] [address]      Patient: Lluvia Kumar   : 1970  Referring practitioner: Edgar De La Paz MD  Date of Initial Visit: Type: THERAPY  Noted: 2024  Today's Date: 3/21/2024  Patient seen for 10 sessions    Certification Period 3/21/2024 thru 2024       Visit Diagnoses:    ICD-10-CM ICD-9-CM   1. Chronic pain of left ankle  M25.572 719.47    G89.29 338.29       Subjective     Patient states he feels she is making steady progress and she feels that her ankle motion and ability to walk has been significantly improved over the past couple of weeks.  She reports compliance with home exercise program and continues to stretch several times throughout the day to maintain her improvements and ankle mobility.    Objective   See Exercise, Manual, and Modality Logs for complete treatment.       Assessment/Plan     Patient is progressing very well and has demonstrated improvement in her overall ability to perform community ambulation and functional tasks since having the boot off for the past couple of weeks.  Patient demonstrates an improved overall stability with ambulation and improved ability to perform dynamic activity in the clinic without loss of balance.  Will continue to progress activity as indicated.      Timed:         Manual Therapy:    18     mins  52271;     Therapeutic Exercise:    30     mins  46565;     Neuromuscular Tez:    10    mins  20836;    Therapeutic Activity:          mins  31369;     Gait Training:           mins  90776;     Ultrasound:          mins  52938;    Ionto                                   mins   48713  Self Care                            mins   61875  Canalith Repos         mins 61296  Electrical Stimulation:         mins  70944    Un-Timed:  Electrical Stimulation:         mins  75428 ( );  Dry Needling          mins self-pay  Traction          mins 62508      Timed  Treatment:   58   mins   Total Treatment:     58   mins    Jesus Roland PT, TAYT, OCS, Cert. DN  [place] License: [id number]

## 2024-03-22 ENCOUNTER — TREATMENT (OUTPATIENT)
Dept: PHYSICAL THERAPY | Facility: CLINIC | Age: 54
End: 2024-03-22
Payer: COMMERCIAL

## 2024-03-22 DIAGNOSIS — M25.572 CHRONIC PAIN OF LEFT ANKLE: Primary | ICD-10-CM

## 2024-03-22 DIAGNOSIS — G89.29 CHRONIC PAIN OF LEFT ANKLE: Primary | ICD-10-CM

## 2024-03-29 ENCOUNTER — TREATMENT (OUTPATIENT)
Dept: PHYSICAL THERAPY | Facility: CLINIC | Age: 54
End: 2024-03-29
Payer: COMMERCIAL

## 2024-03-29 DIAGNOSIS — M25.572 CHRONIC PAIN OF LEFT ANKLE: Primary | ICD-10-CM

## 2024-03-29 DIAGNOSIS — G89.29 CHRONIC PAIN OF LEFT ANKLE: Primary | ICD-10-CM

## 2024-03-29 PROCEDURE — 97112 NEUROMUSCULAR REEDUCATION: CPT | Performed by: PHYSICAL THERAPIST

## 2024-03-29 PROCEDURE — 97140 MANUAL THERAPY 1/> REGIONS: CPT | Performed by: PHYSICAL THERAPIST

## 2024-03-29 PROCEDURE — 97110 THERAPEUTIC EXERCISES: CPT | Performed by: PHYSICAL THERAPIST

## 2024-03-29 NOTE — PROGRESS NOTES
Physical Therapy Daily Treatment Note    [facility]   [address] [facility], [address] [place] [address]      Patient: Lluvia Kumar   : 1970  Referring practitioner: Edgar De La Paz MD  Date of Initial Visit: Type: THERAPY  Noted: 2024  Today's Date: 3/29/2024  Patient seen for 12 sessions    Certification Period 3/29/2024 thru 2024       Visit Diagnoses:    ICD-10-CM ICD-9-CM   1. Chronic pain of left ankle  M25.572 719.47    G89.29 338.29       Subjective     Pt states that she feels like she is still making good progress with therapy and she has been able to walk longer without pain and feels more stable.     Objective   See Exercise, Manual, and Modality Logs for complete treatment.       Assessment/Plan     Pt is progressing well and she was able to make improvements in the quality of her gait in the clinic today. Will cont to progress as indicated.       Timed:         Manual Therapy:    14     mins  69758;     Therapeutic Exercise:         mins  42693;     Neuromuscular Tez:    13    mins  92087;    Therapeutic Activity:     30     mins  04812;     Gait Training:           mins  18494;     Ultrasound:          mins  02087;    Ionto                                   mins   59085  Self Care                            mins   81715  Canalith Repos         mins 17095  Electrical Stimulation:         mins  74907    Un-Timed:  Electrical Stimulation:         mins  65158 ( );  Dry Needling          mins self-pay  Traction          mins 41457      Timed Treatment:   57   mins   Total Treatment:     57   mins    Jesus Roland, PT, DPT, OCS, Cert. DN  [place] License: [id number]

## 2024-04-03 ENCOUNTER — TREATMENT (OUTPATIENT)
Dept: PHYSICAL THERAPY | Facility: CLINIC | Age: 54
End: 2024-04-03
Payer: COMMERCIAL

## 2024-04-03 DIAGNOSIS — M25.572 CHRONIC PAIN OF LEFT ANKLE: Primary | ICD-10-CM

## 2024-04-03 DIAGNOSIS — G89.29 CHRONIC PAIN OF LEFT ANKLE: Primary | ICD-10-CM

## 2024-04-03 NOTE — PROGRESS NOTES
Physical Therapy Daily Treatment Note    [facility]   [address] [facility], [address] [place] [address]      Patient: Lluvia Kumar   : 1970  Referring practitioner: Edgar De La Paz MD  Date of Initial Visit: Type: THERAPY  Noted: 2024  Today's Date: 4/3/2024  Patient seen for 13 sessions    Certification Period 4/3/2024 thru 2024       Visit Diagnoses:    ICD-10-CM ICD-9-CM   1. Chronic pain of left ankle  M25.572 719.47    G89.29 338.29       Subjective     Pt states that she feels like she is making progress and she has been able to do more prolonged standing and walking without having a significant increase in pain.     Objective   See Exercise, Manual, and Modality Logs for complete treatment.       Assessment/Plan     Pt is progressing well and she demonstrates a steady, incremental improvement in her ankle mobility. Continuing to progress activity in the clinic as indicated.       Timed:         Manual Therapy:    18     mins  34948;     Therapeutic Exercise:    13     mins  66336;     Neuromuscular Tez:    30    mins  20783;    Therapeutic Activity:          mins  69668;     Gait Training:           mins  27976;     Ultrasound:          mins  29550;    Ionto                                   mins   56882  Self Care                            mins   40810  Canalith Repos         mins 06729  Electrical Stimulation:         mins  23479    Un-Timed:  Electrical Stimulation:         mins  54127 ( );  Dry Needling          mins self-pay  Traction          mins 11333      Timed Treatment:   61   mins   Total Treatment:     61   mins    Jesus Roland, PT, DPT, OCS, Cert. DN  [place] License: [id number]

## 2024-04-05 ENCOUNTER — TREATMENT (OUTPATIENT)
Dept: PHYSICAL THERAPY | Facility: CLINIC | Age: 54
End: 2024-04-05
Payer: COMMERCIAL

## 2024-04-05 DIAGNOSIS — G89.29 CHRONIC PAIN OF LEFT ANKLE: Primary | ICD-10-CM

## 2024-04-05 DIAGNOSIS — M25.572 CHRONIC PAIN OF LEFT ANKLE: Primary | ICD-10-CM

## 2024-04-05 NOTE — PROGRESS NOTES
Physical Therapy Daily Treatment Note    [facility]   [address] [facility], [address] [place] [address]      Patient: Lluvia Kumar   : 1970  Referring practitioner: Edgar De La Paz MD  Date of Initial Visit: Type: THERAPY  Noted: 2024  Today's Date: 2024  Patient seen for 14 sessions    Certification Period 2024 thru 7/3/2024       Visit Diagnoses:    ICD-10-CM ICD-9-CM   1. Chronic pain of left ankle  M25.572 719.47    G89.29 338.29       Subjective     Pt states that she is feeling some improvement overall and she continues to make progress with prolonged standing and walking activities. She still uses the one crutch when she is out of the house and around crowds, but she is not using it around the house.     Objective   See Exercise, Manual, and Modality Logs for complete treatment.       Assessment/Plan     Pt is making steady progress and she demonstrates an improvement in her tolerance to standing activity, PROM, and strength. Will cont to progress as indicated.       Timed:         Manual Therapy:    24     mins  52008;     Therapeutic Exercise:         mins  69879;     Neuromuscular Tez:        mins  20447;    Therapeutic Activity:     30     mins  17024;     Gait Training:           mins  54625;     Ultrasound:          mins  65546;    Ionto                                   mins   64621  Self Care                            mins   73622  Canalith Repos         mins 89779  Electrical Stimulation:         mins  93647    Un-Timed:  Electrical Stimulation:         mins  60150 ( );  Dry Needling          mins self-pay  Traction          mins 86056      Timed Treatment:   54   mins   Total Treatment:     54   mins    Jesus Roland, PT, DPT, OCS, Cert. DN  [place] License: [id number]

## 2024-04-11 NOTE — PROGRESS NOTES
Physical Therapy Daily Treatment Note    [facility]   [address] [facility], [address] [place] [address]      Patient: Lluvia Kumar   : 1970  Referring practitioner: Edgar De La Paz MD  Date of Initial Visit: Type: THERAPY  Noted: 2024  Today's Date: 4/10/2024  Patient seen for 15 sessions    Certification Period 4/10/2024 thru 2024       Visit Diagnoses:    ICD-10-CM ICD-9-CM   1. Chronic pain of left ankle  M25.572 719.47    G89.29 338.29       Subjective     Patient states she feels like she is making steady progress and she has been able to stand and walk for long periods of time without increasing pain in the left ankle.  Patient feels that she still has some limitation in her range of motion and she does not feel as stable as she would like but she is happy with the progress she has made over the past couple of weeks.    Objective   See Exercise, Manual, and Modality Logs for complete treatment.       Assessment/Plan     Improvement noted in patient's active range of motion of the left ankle in all planes.  Patient responded very well to recent manual therapy intervention and she demonstrates an improved quality of gait in the clinic.  Patient is able to tolerate prolonged standing activity without exacerbation of symptoms and we will continue to progress activity as indicated.      Timed:         Manual Therapy:    18     mins  79301;     Therapeutic Exercise:    14     mins  51624;     Neuromuscular Tez:        mins  60318;    Therapeutic Activity:     25     mins  43445;     Gait Training:           mins  70679;     Ultrasound:          mins  26074;    Ionto                                   mins   14165  Self Care                            mins   88990  Canalith Repos         mins 31782  Electrical Stimulation:         mins  19338    Un-Timed:  Electrical Stimulation:         mins  81975 ( );  Dry Needling          mins self-pay  Traction          mins 27561      Timed  Treatment:   57   mins   Total Treatment:     57   mins    Jesus Roland PT, TAYT, OCS, Cert. DN  [place] License: [id number]

## 2024-04-12 ENCOUNTER — TREATMENT (OUTPATIENT)
Dept: PHYSICAL THERAPY | Facility: CLINIC | Age: 54
End: 2024-04-12
Payer: COMMERCIAL

## 2024-04-12 DIAGNOSIS — M25.572 CHRONIC PAIN OF LEFT ANKLE: Primary | ICD-10-CM

## 2024-04-12 DIAGNOSIS — G89.29 CHRONIC PAIN OF LEFT ANKLE: Primary | ICD-10-CM

## 2024-04-19 ENCOUNTER — TREATMENT (OUTPATIENT)
Dept: PHYSICAL THERAPY | Facility: CLINIC | Age: 54
End: 2024-04-19
Payer: COMMERCIAL

## 2024-04-19 DIAGNOSIS — G89.29 CHRONIC PAIN OF LEFT ANKLE: Primary | ICD-10-CM

## 2024-04-19 DIAGNOSIS — M25.572 CHRONIC PAIN OF LEFT ANKLE: Primary | ICD-10-CM

## 2024-04-19 NOTE — PROGRESS NOTES
Physical Therapy Daily Treatment Note    [facility]   [address] [facility], [address] [place] [address]      Patient: Lluvia Kumar   : 1970  Referring practitioner: Edgar De La Paz MD  Date of Initial Visit: Type: THERAPY  Noted: 2024  Today's Date: 2024  Patient seen for 16 sessions    Certification Period 2024 thru 2024       Visit Diagnoses:    ICD-10-CM ICD-9-CM   1. Chronic pain of left ankle  M25.572 719.47    G89.29 338.29       Subjective     Patient states he feels like she is doing well and she is making progress with therapy.  She continues to have some soreness in the ankle after standing and walking for long periods of time but she feels less limitation each week.  Patient reports compliance with home exercise program.    Objective   See Exercise, Manual, and Modality Logs for complete treatment.       Assessment/Plan     Patient progressing very well and demonstrates an improvement in her overall ankle and foot mobility.  She continues to have some limitation in ankle dorsiflexion, but this is to be expected given the extent of surgical repair to the ankle mortise.  Will continue to progress functional activity as indicated and will address limitations and range of motion as well.      Timed:         Manual Therapy:    16     mins  54191;     Therapeutic Exercise:    14     mins  22617;     Neuromuscular Tez:        mins  03469;    Therapeutic Activity:     28     mins  32782;     Gait Training:           mins  87023;     Ultrasound:          mins  65469;    Ionto                                   mins   29180  Self Care                            mins   38837  Canalith Repos         mins 57601  Electrical Stimulation:         mins  07366    Un-Timed:  Electrical Stimulation:         mins  22584 ( );  Dry Needling          mins self-pay  Traction          mins 66462      Timed Treatment:   58   mins   Total Treatment:     58   mins    Jesus Roland, PT, DPT,  OCS, Cert. DN  [place] License: [id number]

## 2024-04-26 ENCOUNTER — TREATMENT (OUTPATIENT)
Dept: PHYSICAL THERAPY | Facility: CLINIC | Age: 54
End: 2024-04-26
Payer: COMMERCIAL

## 2024-04-26 DIAGNOSIS — M25.572 CHRONIC PAIN OF LEFT ANKLE: Primary | ICD-10-CM

## 2024-04-26 DIAGNOSIS — G89.29 CHRONIC PAIN OF LEFT ANKLE: Primary | ICD-10-CM

## 2024-04-26 PROCEDURE — 97140 MANUAL THERAPY 1/> REGIONS: CPT | Performed by: PHYSICAL THERAPIST

## 2024-04-26 PROCEDURE — 97112 NEUROMUSCULAR REEDUCATION: CPT | Performed by: PHYSICAL THERAPIST

## 2024-04-26 PROCEDURE — 97530 THERAPEUTIC ACTIVITIES: CPT | Performed by: PHYSICAL THERAPIST

## 2024-04-26 NOTE — PROGRESS NOTES
Physical Therapy Daily Treatment Note    [facility]   [address] [facility], [address] [place] [address]      Patient: Lluvia Kumar   : 1970  Referring practitioner: Edgar De La Paz MD  Date of Initial Visit: Type: THERAPY  Noted: 2024  Today's Date: 2024  Patient seen for 17 sessions    Certification Period 2024 thru 2024       Visit Diagnoses:    ICD-10-CM ICD-9-CM   1. Chronic pain of left ankle  M25.572 719.47    G89.29 338.29       Subjective     Pt states that she feels like she has made good progress and she has less pain in the foot and ankle with most activities. She does feel some occasional instability when walking on uneven surfaces for a while, but is getting more confident in her balanace and strength.     Objective   See Exercise, Manual, and Modality Logs for complete treatment.       Assessment/Plan     Pt is progressing well and she demonstrates an improvd ability to perform functional and dynamic tasks in the clinic. She continues to have some limitation in her DF ROM, but this seems to be improving incrementally. Will cont to progress as indicated.       Timed:         Manual Therapy:    24     mins  21166;     Therapeutic Exercise:         mins  84397;     Neuromuscular Tez:    15    mins  75027;    Therapeutic Activity:     15     mins  54374;     Gait Training:           mins  66887;     Ultrasound:          mins  83873;    Ionto                                   mins   09315  Self Care                            mins   74071  Canalith Repos         mins 13870  Electrical Stimulation:         mins  07115    Un-Timed:  Electrical Stimulation:         mins  10248 ( );  Dry Needling          mins self-pay  Traction          mins 12259      Timed Treatment:   54   mins   Total Treatment:     54   mins    Jesus Roland, PT, DPT, OCS, Cert. DN  [place] License: [id number]

## 2024-04-30 NOTE — PROGRESS NOTES
Physical Therapy Daily Treatment Note    [facility]   [address] [facility], [address] [place] [address]      Patient: Lluvia Kumar   : 1970  Referring practitioner: Edgar De La Paz MD  Date of Initial Visit: Type: THERAPY  Noted: 2024  Today's Date: 2024  Patient seen for 18 sessions    Certification Period 2024 thru 2024       Visit Diagnoses:    ICD-10-CM ICD-9-CM   1. Chronic pain of left ankle  M25.572 719.47    G89.29 338.29       Subjective     Patient states that she feels like she is making steady progress and she continues to be able to stand and walk for long periods of time without any significant significant exacerbation of symptoms in the ankle.  She does continue to have some frustration in regards to her range of motion but it she feels like she is making some progress.    Objective   See Exercise, Manual, and Modality Logs for complete treatment.       Assessment/Plan     Patient continues to have some limitation in left ankle dorsiflexion although functionally she is demonstrating improvement of her left ankle dorsiflexion and is able to move past neutral and to approximately 10 degrees dorsiflexion.  Patient demonstrates an improvement in her overall gait and is able to perform functional activities without exacerbation of symptoms.  Will continue to progress activity as indicated.      Timed:         Manual Therapy:    16     mins  82744;     Therapeutic Exercise:         mins  73214;     Neuromuscular Tez:    15    mins  35493;    Therapeutic Activity:     26     mins  86736;     Gait Training:           mins  67566;     Ultrasound:          mins  72489;    Ionto                                   mins   42267  Self Care                            mins   51136  Canalith Repos         mins 04147  Electrical Stimulation:         mins  98459    Un-Timed:  Electrical Stimulation:         mins  80625 ( );  Dry Needling          mins self-pay  Traction           mins 76805      Timed Treatment:   57   mins   Total Treatment:     57   mins    Jesus Roland, PT, DPT, OCS, Cert. DN  [place] License: [id number]

## 2024-05-02 ENCOUNTER — TREATMENT (OUTPATIENT)
Dept: PHYSICAL THERAPY | Facility: CLINIC | Age: 54
End: 2024-05-02
Payer: COMMERCIAL

## 2024-05-02 DIAGNOSIS — M25.572 CHRONIC PAIN OF LEFT ANKLE: Primary | ICD-10-CM

## 2024-05-02 DIAGNOSIS — G89.29 CHRONIC PAIN OF LEFT ANKLE: Primary | ICD-10-CM

## 2024-05-08 ENCOUNTER — OFFICE VISIT (OUTPATIENT)
Dept: ORTHOPEDIC SURGERY | Facility: CLINIC | Age: 54
End: 2024-05-08
Payer: COMMERCIAL

## 2024-05-08 VITALS
WEIGHT: 200 LBS | SYSTOLIC BLOOD PRESSURE: 122 MMHG | DIASTOLIC BLOOD PRESSURE: 82 MMHG | BODY MASS INDEX: 33.32 KG/M2 | HEIGHT: 65 IN

## 2024-05-08 DIAGNOSIS — Z98.890 S/P ORIF (OPEN REDUCTION INTERNAL FIXATION) FRACTURE: Primary | ICD-10-CM

## 2024-05-08 DIAGNOSIS — Z87.81 S/P ORIF (OPEN REDUCTION INTERNAL FIXATION) FRACTURE: Primary | ICD-10-CM

## 2024-05-08 PROCEDURE — 99213 OFFICE O/P EST LOW 20 MIN: CPT | Performed by: ORTHOPAEDIC SURGERY

## 2024-05-21 ENCOUNTER — TELEPHONE (OUTPATIENT)
Dept: PHYSICAL THERAPY | Facility: CLINIC | Age: 54
End: 2024-05-21

## 2024-09-04 ENCOUNTER — OFFICE VISIT (OUTPATIENT)
Dept: ORTHOPEDIC SURGERY | Facility: CLINIC | Age: 54
End: 2024-09-04
Payer: COMMERCIAL

## 2024-09-04 VITALS — BODY MASS INDEX: 33.15 KG/M2 | WEIGHT: 199 LBS | HEIGHT: 65 IN

## 2024-09-04 DIAGNOSIS — Z98.890 S/P ORIF (OPEN REDUCTION INTERNAL FIXATION) FRACTURE: Primary | ICD-10-CM

## 2024-09-04 DIAGNOSIS — Z87.81 S/P ORIF (OPEN REDUCTION INTERNAL FIXATION) FRACTURE: Primary | ICD-10-CM

## 2024-09-04 NOTE — PROGRESS NOTES
"                          [facility] Follow Up     Office Follow Up Visit     Date: 09/04/2024   Patient Name: Lluvia Kumar  MRN: [id number]  YOB: 1970  Chief Complaint:   Chief Complaint   Patient presents with    Follow-up     4 month recheck- 9 months status post left ORIF ankle syndesmotic fixation (DOS 12/5/23)      History of Present Illness:   Lluvia Kumar is a 54 y.o. female who is here today for follow up for left ankle and syndesmotic ORIF on December 5.  Comes in ambulating in a normal shoe.  Has returned to all of her activities.  Continues to do stretching exercises at home.  Is happy with her progress with no new complaints.    Subjective   I reviewed the patient's chief complaint, history of present illness, review of systems, past medical history, surgical history, family history, social history, medications and allergy list   Objective    Vital Signs:   Vitals:    09/04/24 1253   Weight: 90.3 kg (199 lb)   Height: 165.3 cm (65.08\")     Body mass index is 33.03 kg/m².    Ortho Exam:  Left lower extremity foot and ankle exam:  Leg compartments soft and compressible.  Incision well-healed both medially and laterally.  Able to actively plantarflex and dorsiflex ankle and all toes.  Minimal appreciable swelling compared to contralateral.  Toes warm and well-perfused.  Palpable DP pulse.  Nonantalgic gait.  Ankle dorsiflexion to between about 5 to 10 degrees past neutral.    Results Review:  XR Ankle 3+ View Left  Left Ankle X-Ray 09/04/24   Indication: Pain  Views: 3 weight bearing , comparison to previous  Findings: xrays reviewed by me today in the office and show, hardware in   place with proper alignment, no signs of hardware failure, ankle mortise   well-maintained        Assessment / Plan    Assessment/Plan:   Diagnoses and all orders for this visit:    1. S/P ORIF (open reduction internal fixation) fracture (Primary)  -     XR Ankle 3+ View " Left      Patient is now about 9 months out from her surgery.  Appears to have made a full recovery.  Patient can continue to advance her activity as tolerated in a normal shoe.  Encourage patient continue performing exercises that she learned in physical therapy at home on her own.  At this point we will plan to see patient back in 1 year for repeat x-rays and reevaluation.  I am happy with her progress and it was a pleasure seeing her today.    Follow Up:   Return in about 1 year (around 9/4/2025) for F/u Recheck with images.      Edgar De La Paz MD  [facility] [place] [facility]

## (undated) DEVICE — DRSNG GZ PETROLTM XEROFORM CURAD 1X8IN STRL

## (undated) DEVICE — DRSNG PAD ABD 8X10IN STRL

## (undated) DEVICE — SUT MNCRYL PLS ANTIB UD 3/0 PS2 27IN

## (undated) DEVICE — BNDG ELAS CO-FLEX SLF ADHR 4IN5YD LF STRL

## (undated) DEVICE — APPL CHLORAPREP TINTED 26ML TEAL

## (undated) DEVICE — ANTIBACTERIAL UNDYED BRAIDED (POLYGLACTIN 910), SYNTHETIC ABSORBABLE SUTURE: Brand: COATED VICRYL

## (undated) DEVICE — ST TBG CONN PNEUMOCLEAR EVAC SMOKE HEAT/HUMID

## (undated) DEVICE — SKIN AFFIX SURG ADHESIVE 72/CS 0.55ML: Brand: MEDLINE

## (undated) DEVICE — DRAPE,TOP,102X53,STERILE: Brand: MEDLINE

## (undated) DEVICE — SPNG GZ WOVN 4X4IN 12PLY 10/BX STRL

## (undated) DEVICE — GLV SURG PREMIERPRO MIC LTX PF SZ6.5 BRN

## (undated) DEVICE — Device

## (undated) DEVICE — STRAP POSTN KN/BDY FM 5X72IN DISP

## (undated) DEVICE — COLUMN DRAPE

## (undated) DEVICE — KT PUMP INFUBLOCK MDL 2100 PMKITSOLIS

## (undated) DEVICE — CANNULA SEAL

## (undated) DEVICE — SCRB SURG BACTOSHIELD CHG 4PCT 4OZ

## (undated) DEVICE — SUT ETHLN 3/0 PC5 18IN 1893G

## (undated) DEVICE — BNDG ELAS CO-FLEX SLF ADHR 6IN 5YD LF STRL

## (undated) DEVICE — TBG PENCL TELESCP MEGADYNE SMOKE EVAC 10FT

## (undated) DEVICE — PK MAJ GYN DAVINCI 10

## (undated) DEVICE — CVR HNDL LIGHT RIGID

## (undated) DEVICE — INTENDED FOR TISSUE SEPARATION, AND OTHER PROCEDURES THAT REQUIRE A SHARP SURGICAL BLADE TO PUNCTURE OR CUT.: Brand: BARD-PARKER ® SAFETYLOCK CARBON RIB-BACK BLADES

## (undated) DEVICE — TIP COVER ACCESSORY

## (undated) DEVICE — SCRW CORT LP SS 2.7X16MM
Type: IMPLANTABLE DEVICE | Site: ANKLE | Status: NON-FUNCTIONAL
Removed: 2023-12-05

## (undated) DEVICE — GLV SURG SENSICARE PI MIC PF SZ7.5 LF STRL

## (undated) DEVICE — GLV SURG PREMIERPRO MIC LTX PF SZ7.5 BRN

## (undated) DEVICE — BLADELESS OBTURATOR: Brand: WECK VISTA

## (undated) DEVICE — PK EXTREM LOWR 10

## (undated) DEVICE — SUT VIC 0 SH 27IN J418H

## (undated) DEVICE — ANTIBACTERIAL UNDYED BRAIDED (POLYGLACTIN 910), SYNTHETIC ABSORBABLE SURGICAL SUTURE: Brand: COATED VICRYL

## (undated) DEVICE — BLANKT WARM UPPR/BDY ARM/OUT 57X196CM

## (undated) DEVICE — GLV SURG PREMIERPRO MIC LTX PF SZ7 BRN

## (undated) DEVICE — UNDERCAST PADDING: Brand: DEROYAL

## (undated) DEVICE — KT POSTN TRENDELENBURG NBXL PAD WING STRAP TABL HDRST XLG

## (undated) DEVICE — PAD ARMBRD SURG CONVOL 7.5X20X2IN

## (undated) DEVICE — SNAP KOVER: Brand: UNBRANDED

## (undated) DEVICE — MANIP UTER ADVINCULA DELINEATOR SFT/CUP 2.5CM

## (undated) DEVICE — ARM DRAPE

## (undated) DEVICE — DRP C/ARMOR

## (undated) DEVICE — UNDERGLV SURG BIOGEL INDICATOR LF PF 7.5

## (undated) DEVICE — TRAP FLD MINIVAC MEGADYNE 100ML

## (undated) DEVICE — PATIENT RETURN ELECTRODE, SINGLE-USE, CONTACT QUALITY MONITORING, ADULT, WITH 9FT CORD, FOR PATIENTS WEIGING OVER 33LBS. (15KG): Brand: MEGADYNE

## (undated) DEVICE — GLV SURG SENSICARE PI MIC PF SZ7 LF STRL

## (undated) DEVICE — GLV SURG SENSICARE PI ORTHO SZ7.5 LF STRL

## (undated) DEVICE — PIN FIX TEMP BBTAK

## (undated) DEVICE — GLV SURG SENSICARE PI MIC PF SZ6.5 LF STRL